# Patient Record
Sex: MALE | Race: WHITE | Employment: FULL TIME | URBAN - METROPOLITAN AREA
[De-identification: names, ages, dates, MRNs, and addresses within clinical notes are randomized per-mention and may not be internally consistent; named-entity substitution may affect disease eponyms.]

---

## 2017-04-25 ENCOUNTER — OFFICE VISIT (OUTPATIENT)
Dept: NEUROLOGY | Age: 62
End: 2017-04-25

## 2017-04-25 VITALS
WEIGHT: 218.8 LBS | HEART RATE: 74 BPM | OXYGEN SATURATION: 96 % | HEIGHT: 74 IN | DIASTOLIC BLOOD PRESSURE: 68 MMHG | SYSTOLIC BLOOD PRESSURE: 134 MMHG | RESPIRATION RATE: 18 BRPM | BODY MASS INDEX: 28.08 KG/M2 | TEMPERATURE: 98.1 F

## 2017-04-25 DIAGNOSIS — G35 MULTIPLE SCLEROSIS (HCC): Primary | ICD-10-CM

## 2017-04-25 NOTE — PROGRESS NOTES
Neurology Consult      Subjective:      Idalia Lo is a 64 y.o. male who returns with 12+ years of relapsing remitting MS. Is on 3 times a week Copaxone. I do not think he remembers his last relapse. Does not mention any new medical or surgical history. Had an episode in the recent past where he felt a little exhausted but simply recouped himself by getting rest and was back to his baseline. Had a MRI scan of the cervical spine in December showing simply an established chronic C2 lesion and mild spinal stenosis at C6-7 and mild right neural foraminal encroachment C5-6. MRI of the brain stable no enhancement and minimal T2 lesion burden. Has downsized in his house reflecting the practical end of things and is doing well although is in the process of selling his house because wife apparently has some allergy issues with the environment and similar problems? Went over the idea of maintaining the status quo with his medicine even though he continues to do well and is no longer in a consistent relapsing remitting mode. Exam looks baseline and will suggest revisit in 6 months. Current Outpatient Prescriptions   Medication Sig Dispense Refill    cpap machine kit by Does Not Apply route.  COPAXONE 40 mg/mL injection INJECT 40MG SUBCUTANEOUSLY THREE TIMES PER WEEK AT LEAST 48 HR APART. ALLOW SYRINGE TO WARM TO ROOM TEMP FOR 20 MIN. DO NOT EXPEL AIR BUBBLE 36 Syringe 3    sitaGLIPtin-metFORMIN (JANUMET) 50-1,000 mg per tablet Take 1 Tab by mouth two (2) times daily (with meals).  methotrexate (RHEUMATREX) 2.5 mg tablet Take  by mouth every Monday.  folic acid (FOLVITE) 1 mg tablet Take  by mouth daily.  Cholecalciferol, Vitamin D3, (VITAMIN D3) 1,000 unit cap Take  by mouth.  losartan (COZAAR) 100 mg tablet Take 100 mg by mouth daily.  lovastatin (MEVACOR) 10 mg tablet Take  by mouth nightly.       betamethasone dipropionate (DIPROLENE) 0.05 % ointment Apply  to affected area two (2) times a day.  hydrocortisone valerate (WEST-SHAKEEL) 0.2 % ointment Apply  to affected area two (2) times a day. use thin layer       solifenacin (VESICARE) 5 mg tablet Take 5 mg by mouth daily.  HYDROcodone-acetaminophen (NORCO) 5-325 mg per tablet Take 1 Tab by mouth every four (4) hours as needed for Pain. 20 Tab 0    piroxicam (FELDENE) 20 mg capsule Take 20 mg by mouth daily. Allergies   Allergen Reactions    Amoxicillin Rash     Past Medical History:   Diagnosis Date    Arthritis     Cancer ([facility]     skin cancer    Diabetes ([facility]     no meds at this time/ md watching results    Hypertension     Other ill-defined conditions 2002    ms    Other ill-defined conditions     psoriasis    Other ill-defined conditions     high cholesterol      Past Surgical History:   Procedure Laterality Date    ABDOMEN SURGERY PROC UNLISTED  2007    cholecystectomy    HX HEENT      benign tumor removed face      Social History     Social History    Marital status:      Spouse name: N/A    Number of children: N/A    Years of education: N/A     Occupational History    Not on file. Social History Main Topics    Smoking status: Never Smoker    Smokeless tobacco: Never Used    Alcohol use No    Drug use: No    Sexual activity: Not on file     Other Topics Concern    Not on file     Social History Narrative      Family History   Problem Relation Age of Onset    Cancer Father       Visit Vitals    /68    Pulse 74    Temp 98.1 °F (36.7 °C) (Oral)    Resp 18    Ht 6' 2\" (1.88 m)    Wt 99.2 kg (218 lb 12.8 oz)    SpO2 96%    BMI 28.09 kg/m2        Review of Systems:   A comprehensive review of systems was negative except for that written in the HPI. Neuro Exam:     Appearance: The patient is well developed, well nourished, provides a coherent history and is in no acute distress. Mental Status: Oriented to time, place and person. Mood and affect appropriate. Cranial Nerves:   Intact visual fields. Fundi are benign. MICHAEL, EOM's full, no nystagmus, no ptosis. Facial sensation is normal. Corneal reflexes are intact. Facial movement is symmetric. Hearing is normal bilaterally. Palate is midline with normal sternocleidomastoid and trapezius muscles are normal. Tongue is midline. Motor:  5/5 strength in upper and lower proximal and distal muscles. Normal bulk and tone. No fasciculations. Reflexes:   Deep tendon reflexes 2+/4 and symmetrical.   Sensory:   Normal to touch, pinprick and vibration. Gait:  Normal gait. Tremor:   No tremor noted. Cerebellar:  No cerebellar signs present. Neurovascular:  Normal heart sounds and regular rhythm, peripheral pulses intact, and no carotid bruits. Assessment:   Multiple sclerosis. Patient is doing fantastic and recommend continuation of Copaxone and revisit in 6 months. Stay as reasonably and safely busy as possible. Plan:   Revisit 6 months.   Signed by :  Chandan Anderson MD

## 2017-04-25 NOTE — MR AVS SNAPSHOT
Visit Information Date & Time Provider Department Dept. Phone Encounter #  
 4/25/2017  1:40 PM Paula Mercado MD [facility]rology [phone] Follow-up Instructions Return in about 6 months (around 10/25/2017). Follow-up and Disposition History Your Appointments 10/24/2017  1:40 PM  
Follow Up with Paula Mercado MD  
[facility] Appt Note: follow up MS  $CP  russell  4/25/17 [place] [address] [facility] [phone] [phone]  
  
   
 [place] [address] [id number] [address] Upcoming Health Maintenance Date Due Hepatitis C Screening 1955 DTaP/Tdap/Td series (1 - Tdap) 5/14/1976 FOBT Q 1 YEAR AGE 50-75 5/14/2005 ZOSTER VACCINE AGE 60> 5/14/2015 INFLUENZA AGE 9 TO ADULT 8/1/2016 Allergies as of 4/25/2017  Review Complete On: 4/25/2017 By: Paula Mercado MD  
  
 Severity Noted Reaction Type Reactions Amoxicillin  04/01/2011    Rash Current Immunizations  Never Reviewed No immunizations on file. Not reviewed this visit You Were Diagnosed With   
  
 Codes Comments [facility]    -  Primary ICD-10-CM: G35 
ICD-9-CM: 193 Vitals BP Pulse Temp Resp Height(growth percentile) Weight(growth percentile) 134/68 74 98.1 °F (36.7 °C) (Oral) 18 6' 2\" (1.88 m) 218 lb 12.8 oz (99.2 kg) SpO2 BMI Smoking Status 96% 28.09 kg/m2 Never Smoker Vitals History BMI and BSA Data Body Mass Index Body Surface Area 28.09 kg/m 2 2.28 m 2 Preferred Pharmacy Pharmacy Name Phone  [address] [phone] Your Updated Medication List  
  
   
This list is accurate as of: 4/25/17  2:09 PM.  Always use your most recent med list.  
  
  
  
  
 betamethasone dipropionate 0.05 % ointment Commonly known as:  Eva Crowder  
 Apply  to affected area two (2) times a day. COPAXONE 40 mg/mL injection Generic drug:  glatiramer INJECT 40MG SUBCUTANEOUSLY THREE TIMES PER WEEK AT LEAST 48 HR APART. ALLOW SYRINGE TO WARM TO ROOM TEMP FOR 20 MIN. DO NOT EXPEL AIR BUBBLE  
  
 cpap machine kit  
by Does Not Apply route. folic acid 1 mg tablet Commonly known as:  Google Take  by mouth daily. HYDROcodone-acetaminophen 5-325 mg per tablet Commonly known as:  Jerl Ards Take 1 Tab by mouth every four (4) hours as needed for Pain.  
  
 hydrocortisone valerate 0.2 % ointment Commonly known as:  WEST-SHAKEEL Apply  to affected area two (2) times a day. use thin layer JANUMET 50-1,000 mg per tablet Generic drug:  SITagliptin-metFORMIN Take 1 Tab by mouth two (2) times daily (with meals). losartan 100 mg tablet Commonly known as:  COZAAR Take 100 mg by mouth daily. lovastatin 10 mg tablet Commonly known as:  MEVACOR Take  by mouth nightly. methotrexate 2.5 mg tablet Commonly known as:  Shellia Millet Take  by mouth every Monday. piroxicam 20 mg capsule Commonly known as:  Rozanna Johnson Take 20 mg by mouth daily. VESIcare 5 mg tablet Generic drug:  solifenacin Take 5 mg by mouth daily. VITAMIN D3 1,000 unit Cap Generic drug:  cholecalciferol Take  by mouth. Follow-up Instructions Return in about 6 months (around 10/25/2017). Patient Instructions PRESCRIPTION REFILL POLICY [facility] Statement to Patients April 1, 2014 In an effort to ensure the large volume of patient prescription refills is processed in the most efficient and expeditious manner, we are asking our patients to assist us by calling your Pharmacy for all prescription refills, this will include also your  Mail Order Pharmacy. The pharmacy will contact our office electronically to continue the refill process. Please do not wait until the last minute to call your pharmacy. We need at least 48 hours (2days) to fill prescriptions. We also encourage you to call your pharmacy before going to  your prescription to make sure it is ready. With regard to controlled substance prescription refill requests (narcotic refills) that need to be picked up at our office, we ask your cooperation by providing us with at least 72 hours (3days) notice that you will need a refill. We will not refill narcotic prescription refill requests after 4:00pm on any weekday, Monday through Thursday, or after 2:00pm on Fridays, or on the weekends. We encourage everyone to explore another way of getting your prescription refill request processed using [employer] / [employer]LL, our patient web portal through our electronic medical record system. [employer] is an efficient and effective way to communicate your medication request directly to the office and  downloadable as an nash on your smart phone . [employer] also features a review functionality that allows you to view your medication list as well as leave messages for your physician. Are you ready to get connected? If so please review the attatched instructions or speak to any of our staff to get you set up right away! Thank you so much for your cooperation. Should you have any questions please contact our Practice Administrator. The Physicians and Staff,  [facility] M[address] What is a living will? A living will is a legal form you use to write down the kind of care you want at the end of your life. It is used by the health professionals who will treat you if you aren't able to decide for yourself. If you put your wishes in writing, your loved ones and others will know what kind of care you want. They won't need to guess. This can ease your mind and be helpful to others. A living will is not the same as an estate or property will.  An estate will explains what you want to happen with your money and property after you die. Is a living will a legal document? A living will is a legal document. Each state has its own laws about living sanders. If you move to another state, make sure that your living will is legal in the state where you now live. Or you might use a universal form that has been approved by many states. This kind of form can sometimes be completed and stored online. Your electronic copy will then be available wherever you have a connection to the Internet. In most cases, doctors will respect your wishes even if you have a form from a different state. · You don't need an  to complete a living will. But legal advice can be helpful if your state's laws are unclear, your health history is complicated, or your family can't agree on what should be in your living will. · You can change your living will at any time. Some people find that their wishes about end-of-life care change as their health changes. · In addition to making a living will, think about completing a medical power of  form. This form lets you name the person you want to make end-of-life treatment decisions for you (your \"health care agent\") if you're not able to. Many hospitals and nursing homes will give you the forms you need to complete a living will and a medical power of . · Your living will is used only if you can't make or communicate decisions for yourself anymore. If you become able to make decisions again, you can accept or refuse any treatment, no matter what you wrote in your living will. · Your state may offer an online registry. This is a place where you can store your living will online so the doctors and nurses who need to treat you can find it right away. What should you think about when creating a living will?  
Talk about your end-of-life wishes with your family members and your doctor. Let them know what you want. That way the people making decisions for you won't be surprised by your choices. Think about these questions as you make your living will: · Do you know enough about life support methods that might be used? If not, talk to your doctor so you know what might be done if you can't breathe on your own, your heart stops, or you're unable to swallow. · What things would you still want to be able to do after you receive life-support methods? Would you want to be able to walk? To speak? To eat on your own? To live without the help of machines? · If you have a choice, where do you want to be cared for? In your home? At a hospital or nursing home? · Do you want certain Taoism practices performed if you become very ill? · If you have a choice at the end of your life, where would you prefer to die? At home? In a hospital or nursing home? Somewhere else? · Would you prefer to be buried or cremated? · Do you want your organs to be donated after you die? What should you do with your living will? · Make sure that your family members and your health care agent have copies of your living will. · Give your doctor a copy of your living will to keep in your medical record. If you have more than one doctor, make sure that each one has a copy. · You may want to put a copy of your living will where it can be easily found. Where can you learn more? Go to http://eleonora-rafael.info/. Enter M532 in the search box to learn more about \"Learning About Living Nir. \" Current as of: February 24, 2016 Content Version: 11.2 © 5259-8455 [employer]. Care instructions adapted under license by [employer] (which disclaims liability or warranty for this information).  If you have questions about a medical condition or this instruction, always ask your healthcare professional. [address] any warranty or liability for your use of this information. Patient Instructions History Introducing [facility]! Jaquelin Yesenia introduces [email or web address] patient portal. Now you can access parts of your medical record, email your doctor's office, and request medication refills online. 1. In your internet browser, go to [email or web address] 2. Click on the First Time User? Click Here link in the Sign In box. You will see the New Member Sign Up page. 3. Enter your [email or web address] Access Code exactly as it appears below. You will not need to use this code after youve completed the sign-up process. If you do not sign up before the expiration date, you must request a new code. · [email or web address] Access Code: [email or web address] Expires: 7/24/2017  1:59 PM 
 
4. Enter the last four digits of your Social Security Number (xxxx) and Date of Birth (mm/dd/yyyy) as indicated and click Submit. You will be taken to the next sign-up page. 5. Create a [email or web address] ID. This will be your [email or web address] login ID and cannot be changed, so think of one that is secure and easy to remember. 6. Create a [email or web address] password. You can change your password at any time. 7. Enter your Password Reset Question and Answer. This can be used at a later time if you forget your password. 8. Enter your e-mail address. You will receive e-mail notification when new information is available in [address] 9. Click Sign Up. You can now view and download portions of your medical record. 10. Click the Download Summary menu link to download a portable copy of your medical information. If you have questions, please visit the Frequently Asked Questions section of the [email or web address] website. Remember, [email or web address] is NOT to be used for urgent needs. For medical emergencies, dial 911. Now available from your iPhone and Android! Please provide this summary of care documentation to your next provider. Your primary care clinician is listed as Marcelle Sylvester.  If you have any questions after today's visit, please call [phone].

## 2017-04-25 NOTE — PATIENT INSTRUCTIONS
[facility]   Statement to Patients  April 1, 2014      In an effort to ensure the large volume of patient prescription refills is processed in the most efficient and expeditious manner, we are asking our patients to assist us by calling your Pharmacy for all prescription refills, this will include also your  Mail Order Pharmacy. The pharmacy will contact our office electronically to continue the refill process. Please do not wait until the last minute to call your pharmacy. We need at least 48 hours (2days) to fill prescriptions. We also encourage you to call your pharmacy before going to  your prescription to make sure it is ready. With regard to controlled substance prescription refill requests (narcotic refills) that need to be picked up at our office, we ask your cooperation by providing us with at least 72 hours (3days) notice that you will need a refill. We will not refill narcotic prescription refill requests after 4:00pm on any weekday, Monday through Thursday, or after 2:00pm on Fridays, or on the weekends. We encourage everyone to explore another way of getting your prescription refill request processed using [employer], our patient web portal through our electronic medical record system. [employer] is an efficient and effective way to communicate your medication request directly to the office and  downloadable as an nash on your smart phone . [employer] also features a review functionality that allows you to view your medication list as well as leave messages for your physician. Are you ready to get connected? If so please review the attatched instructions or speak to any of our staff to get you set up right away! Thank you so much for your cooperation. Should you have any questions please contact our Practice Administrator.     The Physicians and Staff,  [facility] [address]  What is a living will? A living will is a legal form you use to write down the kind of care you want at the end of your life. It is used by the health professionals who will treat you if you aren't able to decide for yourself. If you put your wishes in writing, your loved ones and others will know what kind of care you want. They won't need to guess. This can ease your mind and be helpful to others. A living will is not the same as an estate or property will. An estate will explains what you want to happen with your money and property after you die. Is a living will a legal document? A living will is a legal document. Each state has its own laws about living sanders. If you move to another state, make sure that your living will is legal in the state where you now live. Or you might use a universal form that has been approved by many states. This kind of form can sometimes be completed and stored online. Your electronic copy will then be available wherever you have a connection to the Internet. In most cases, doctors will respect your wishes even if you have a form from a different state. · You don't need an  to complete a living will. But legal advice can be helpful if your state's laws are unclear, your health history is complicated, or your family can't agree on what should be in your living will. · You can change your living will at any time. Some people find that their wishes about end-of-life care change as their health changes. · In addition to making a living will, think about completing a medical power of  form. This form lets you name the person you want to make end-of-life treatment decisions for you (your \"health care agent\") if you're not able to. Many hospitals and nursing homes will give you the forms you need to complete a living will and a medical power of . · Your living will is used only if you can't make or communicate decisions for yourself anymore.  If you become able to make decisions again, you can accept or refuse any treatment, no matter what you wrote in your living will. · Your state may offer an online registry. This is a place where you can store your living will online so the doctors and nurses who need to treat you can find it right away. What should you think about when creating a living will? Talk about your end-of-life wishes with your family members and your doctor. Let them know what you want. That way the people making decisions for you won't be surprised by your choices. Think about these questions as you make your living will:  · Do you know enough about life support methods that might be used? If not, talk to your doctor so you know what might be done if you can't breathe on your own, your heart stops, or you're unable to swallow. · What things would you still want to be able to do after you receive life-support methods? Would you want to be able to walk? To speak? To eat on your own? To live without the help of machines? · If you have a choice, where do you want to be cared for? In your home? At a hospital or nursing home? · Do you want certain Sabianism practices performed if you become very ill? · If you have a choice at the end of your life, where would you prefer to die? At home? In a hospital or nursing home? Somewhere else? · Would you prefer to be buried or cremated? · Do you want your organs to be donated after you die? What should you do with your living will? · Make sure that your family members and your health care agent have copies of your living will. · Give your doctor a copy of your living will to keep in your medical record. If you have more than one doctor, make sure that each one has a copy. · You may want to put a copy of your living will where it can be easily found. Where can you learn more? Go to http://eleonora-rafael.info/. Enter N246 in the search box to learn more about \"Learning About Living Ja Michelle. \"  Current as of: February 24, 2016  Content Version: 11.2  © 9295-8343 [employer]. Care instructions adapted under license by [employer] (which disclaims liability or warranty for this information). If you have questions about a medical condition or this instruction, always ask your healthcare professional. [address] any warranty or liability for your use of this information.

## 2017-09-29 ENCOUNTER — TELEPHONE (OUTPATIENT)
Dept: NEUROLOGY | Age: 62
End: 2017-09-29

## 2017-09-29 NOTE — TELEPHONE ENCOUNTER
Received [facility] PA approval letter regarding Copaxone 40mg  Auth# [id number]  Effective 9/22/17-9/22/19

## 2017-10-09 ENCOUNTER — TELEPHONE (OUTPATIENT)
Dept: NEUROLOGY | Age: 62
End: 2017-10-09

## 2017-10-09 DIAGNOSIS — G35 MULTIPLE SCLEROSIS (HCC): ICD-10-CM

## 2017-10-09 RX ORDER — GLATIRAMER ACETATE 40 MG/ML
INJECTION, SOLUTION SUBCUTANEOUS
Qty: 36 SYRINGE | Refills: 3 | Status: SHIPPED | OUTPATIENT
Start: 2017-10-09 | End: 2018-08-17 | Stop reason: SDUPTHER

## 2017-10-09 NOTE — TELEPHONE ENCOUNTER
----- Message from Martin Singh sent at 10/9/2017 10:37 AM EDT -----  Regarding: ANGEL Thompson/Refill  Pt states he needs refill for Copaxone 40 mg for [facility] phone [phone] fax [phone], he only has two weeks left of the medication. His contact number is [phone].

## 2017-10-24 ENCOUNTER — HOSPITAL ENCOUNTER (OUTPATIENT)
Dept: GENERAL RADIOLOGY | Age: 62
Discharge: HOME | End: 2017-10-24
Attending: SPECIALIST
Payer: COMMERCIAL

## 2017-10-24 ENCOUNTER — OFFICE VISIT (OUTPATIENT)
Dept: NEUROLOGY | Age: 62
End: 2017-10-24

## 2017-10-24 VITALS
OXYGEN SATURATION: 98 % | HEIGHT: 74 IN | WEIGHT: 221.9 LBS | DIASTOLIC BLOOD PRESSURE: 70 MMHG | TEMPERATURE: 98.1 F | SYSTOLIC BLOOD PRESSURE: 130 MMHG | BODY MASS INDEX: 28.48 KG/M2 | HEART RATE: 75 BPM | RESPIRATION RATE: 18 BRPM

## 2017-10-24 DIAGNOSIS — M54.2 CERVICALGIA: ICD-10-CM

## 2017-10-24 DIAGNOSIS — G35 MULTIPLE SCLEROSIS (HCC): Primary | ICD-10-CM

## 2017-10-24 PROCEDURE — 72052 X-RAY EXAM NECK SPINE 6/>VWS: CPT

## 2017-10-24 RX ORDER — AMLODIPINE BESYLATE 5 MG/1
5 TABLET ORAL DAILY
COMMUNITY

## 2017-10-24 RX ORDER — GLIPIZIDE 5 MG/1
TABLET ORAL 2 TIMES DAILY
COMMUNITY

## 2017-10-24 RX ORDER — ATORVASTATIN CALCIUM 40 MG/1
TABLET, FILM COATED ORAL DAILY
COMMUNITY

## 2017-10-24 NOTE — MR AVS SNAPSHOT
Visit Information Date & Time Provider Department Dept. Phone Encounter #  
 10/24/2017  1:40 PM Nadir Guevara MD [facility] Neurology [facility] [phone] [id number] Follow-up Instructions Return in about 6 months (around 4/24/2018). Upcoming Health Maintenance Date Due Hepatitis C Screening 1955 DTaP/Tdap/Td series (1 - Tdap) 5/14/1976 FOBT Q 1 YEAR AGE 50-75 5/14/2005 ZOSTER VACCINE AGE 60> 3/14/2015 INFLUENZA AGE 9 TO ADULT 8/1/2017 Allergies as of 10/24/2017  Review Complete On: 10/24/2017 By: Nadir Guevara MD  
  
 Severity Noted Reaction Type Reactions Amoxicillin  04/01/2011    Rash Current Immunizations  Never Reviewed No immunizations on file. Not reviewed this visit You Were Diagnosed With   
  
 Codes Comments [facility]    -  Primary ICD-10-CM: G35 
ICD-9-CM: 932 Cervicalgia     ICD-10-CM: M54.2 ICD-9-CM: 723.1 Vitals BP Pulse Temp Resp Height(growth percentile) Weight(growth percentile) 130/70 75 98.1 °F (36.7 °C) (Oral) 18 6' 2\" (1.88 m) 221 lb 14.4 oz (100.7 kg) SpO2 BMI Smoking Status 98% 28.49 kg/m2 Never Smoker Vitals History BMI and BSA Data Body Mass Index Body Surface Area  
 28.49 kg/m 2 2.29 m 2 Preferred Pharmacy Pharmacy Name Phone  [address] [phone] Your Updated Medication List  
  
   
This list is accurate as of: 10/24/17  2:16 PM.  Always use your most recent med list. amLODIPine 5 mg tablet Commonly known as:  Clarke Dedrick Take 5 mg by mouth daily. betamethasone dipropionate 0.05 % ointment Commonly known as:  Magdaline Tiana Apply  to affected area two (2) times a day. COPAXONE 40 mg/mL injection Generic drug:  glatiramer INJECT 40MG SUBCUTANEOUSLY THREE TIMES PER WEEK AT LEAST 48 HR APART. ALLOW SYRINGE TO WARM TO ROOM TEMP FOR 20 MIN. DO NOT EXPEL AIR BUBBLE  
  
 cpap machine kit  
by Does Not Apply route. folic acid 1 mg tablet Commonly known as:  Google Take  by mouth daily. glipiZIDE 5 mg tablet Commonly known as:  Esther Macedo Take  by mouth two (2) times a day. hydrocortisone valerate 0.2 % ointment Commonly known as:  WEST-SHAKEEL Apply  to affected area two (2) times a day. use thin layer JANUMET 50-1,000 mg per tablet Generic drug:  SITagliptin-metFORMIN Take 1 Tab by mouth two (2) times daily (with meals). LIPITOR 40 mg tablet Generic drug:  atorvastatin Take  by mouth daily. losartan 100 mg tablet Commonly known as:  COZAAR Take 100 mg by mouth daily. methotrexate 2.5 mg tablet Commonly known as:  Melanie Skcecyns Take  by mouth every Monday. VESIcare 5 mg tablet Generic drug:  solifenacin Take 5 mg by mouth daily. VITAMIN D3 1,000 unit Cap Generic drug:  cholecalciferol Take  by mouth. Follow-up Instructions Return in about 6 months (around 4/24/2018). To-Do List   
 10/24/2017 Imaging:  XR SPINE CERV W OBL/FLEX/EXT MIN 6 V COMP Patient Instructions PRES[facility] RE[facility] POLIC[facility] Statement to Patients April 1, 2014 In an effort to ensure the large volume of patient prescription refills is processed in the most efficient and expeditious manner, we are asking our patients to assist us by calling your Pharmacy for all prescription refills, this will include also your  Mail Order Pharmacy. The pharmacy will contact our office electronically to continue the refill process. Please do not wait until the last minute to call your pharmacy. We need at least 48 hours (2days) to fill prescriptions. We also encourage you to call your pharmacy before going to  your prescription to make sure it is ready. With regard to controlled substance prescription refill requests (narcotic refills) that need to be picked up at our office, we ask your cooperation by providing us with at least 72 hours (3days) notice that you will need a refill. We will not refill narcotic prescription refill requests after 4:00pm on any weekday, Monday through Thursday, or after 2:00pm on Fridays, or on the weekends. We encourage everyone to explore another way of getting your prescription refill request processed using Ecovision, our patient web portal through our electronic medical record system. Ecovision is an efficient and effective way to communicate your medication request directly to the office and  downloadable as an nash on your smart phone . Ecovision also features a review functionality that allows you to view your medication list as well as leave messages for your physician. Are you ready to get connected? If so please review the attatched instructions or speak to any of our staff to get you set up right away! Thank you so much for your cooperation. Should you have any questions please contact our Practice Administrator. The Physicians and Staff,  [facility] 1721 What is a living will? A living will is a legal form you use to write down the kind of care you want at the end of your life. It is used by the health professionals who will treat you if you aren't able to decide for yourself. If you put your wishes in writing, your loved ones and others will know what kind of care you want. They won't need to guess. This can ease your mind and be helpful to others. A living will is not the same as an estate or property will. An estate will explains what you want to happen with your money and property after you die. Is a living will a legal document? A living will is a legal document.  Each state has its own laws about living sanders. If you move to another state, make sure that your living will is legal in the state where you now live. Or you might use a universal form that has been approved by many states. This kind of form can sometimes be completed and stored online. Your electronic copy will then be available wherever you have a connection to the Internet. In most cases, doctors will respect your wishes even if you have a form from a different state. · You don't need an  to complete a living will. But legal advice can be helpful if your state's laws are unclear, your health history is complicated, or your family can't agree on what should be in your living will. · You can change your living will at any time. Some people find that their wishes about end-of-life care change as their health changes. · In addition to making a living will, think about completing a medical power of  form. This form lets you name the person you want to make end-of-life treatment decisions for you (your \"health care agent\") if you're not able to. Many hospitals and nursing homes will give you the forms you need to complete a living will and a medical power of . · Your living will is used only if you can't make or communicate decisions for yourself anymore. If you become able to make decisions again, you can accept or refuse any treatment, no matter what you wrote in your living will. · Your state may offer an online registry. This is a place where you can store your living will online so the doctors and nurses who need to treat you can find it right away. What should you think about when creating a living will? Talk about your end-of-life wishes with your family members and your doctor. Let them know what you want. That way the people making decisions for you won't be surprised by your choices. Think about these questions as you make your living will: · Do you know enough about life support methods that might be used? If not, talk to your doctor so you know what might be done if you can't breathe on your own, your heart stops, or you're unable to swallow. · What things would you still want to be able to do after you receive life-support methods? Would you want to be able to walk? To speak? To eat on your own? To live without the help of machines? · If you have a choice, where do you want to be cared for? In your home? At a hospital or nursing home? · Do you want certain Druze practices performed if you become very ill? · If you have a choice at the end of your life, where would you prefer to die? At home? In a hospital or nursing home? Somewhere else? · Would you prefer to be buried or cremated? · Do you want your organs to be donated after you die? What should you do with your living will? · Make sure that your family members and your health care agent have copies of your living will. · Give your doctor a copy of your living will to keep in your medical record. If you have more than one doctor, make sure that each one has a copy. · You may want to put a copy of your living will where it can be easily found. Where can you learn more? Go to http://eleonora-rafael.info/. Enter P483 in the search box to learn more about \"Learning About Living Nir. \" Current as of: August 8, 2016 Content Version: 11.3 © 1891-9366 VideoLens. Care instructions adapted under license by [employer] (which disclaims liability or warranty for this information). If you have questions about a medical condition or this instruction, always ask your healthcare professional. [place] [address] any warranty or liability for your use of this information. History reviewed and exam performed.   Continue Copaxone as before and in light of recent neck stiffness and discomfort and bilateral arm symptoms will x-ray the neck and multi views. Revisit 6 months. Introducing [facility]! Estefanyrm Angel introduces Svbtle patient portal. Now you can access parts of your medical record, email your doctor's office, and request medication refills online. 1. In your internet browser, go to [email or web address] 2. Click on the First Time User? Click Here link in the Sign In box. You will see the New Member Sign Up page. 3. Enter your Svbtle Access Code exactly as it appears below. You will not need to use this code after youve completed the sign-up process. If you do not sign up before the expiration date, you must request a new code. · Svbtle Access Code: [email or web address] Expires: 1/22/2018  2:16 PM 
 
4. Enter the last four digits of your Social Security Number (xxxx) and Date of Birth (mm/dd/yyyy) as indicated and click Submit. You will be taken to the next sign-up page. 5. Create a Svbtle ID. This will be your Svbtle login ID and cannot be changed, so think of one that is secure and easy to remember. 6. Create a Svbtle password. You can change your password at any time. 7. Enter your Password Reset Question and Answer. This can be used at a later time if you forget your password. 8. Enter your e-mail address. You will receive e-mail notification when new information is available in [address] 9. Click Sign Up. You can now view and download portions of your medical record. 10. Click the Download Summary menu link to download a portable copy of your medical information. If you have questions, please visit the Frequently Asked Questions section of the Svbtle website. Remember, Svbtle is NOT to be used for urgent needs. For medical emergencies, dial 911. Now available from your iPhone and Android! Please provide this summary of care documentation to your next provider. Your primary care clinician is listed as Cuate Garza.  If you have any questions after today's visit, please call [phone].

## 2017-10-24 NOTE — PROGRESS NOTES
Relapsing remitting MS. Neurology Consult      Subjective:      Yanely Martin is a 58 y.o. male who is a long-established patient with relapsing remitting MS. Has been on Copaxone as his first and only immune modulating drug. Has done well and stays reasonably busy. Has noticed with increasing frequency referenced left and more recently right arm pains. Has neck stiffness and pain that is especially noticeable early the next day. Occasionally has issues in his sleep with the same pain spots. Has moved along in a cardiac direction based on initial abnormal EKG and then stress test with an abnormal EKG and a more recently designed study that is being pursued? Denies any known trauma to his neck area although in recent times he has done a lot of moving for both he and his wife as well as daughter. He looks good on today's exam and did not spot any new discrepancies on the neurological exam.  Maintains an excellent attitude and motivation and in my practice he is the patient that is been on Copaxone the longest as I know it. Revisit in 6 months. Current Outpatient Prescriptions   Medication Sig Dispense Refill    glipiZIDE (GLUCOTROL) 5 mg tablet Take  by mouth two (2) times a day.  amLODIPine (NORVASC) 5 mg tablet Take 5 mg by mouth daily.  atorvastatin (LIPITOR) 40 mg tablet Take  by mouth daily.  COPAXONE 40 mg/mL injection INJECT 40MG SUBCUTANEOUSLY THREE TIMES PER WEEK AT LEAST 48 HR APART. ALLOW SYRINGE TO WARM TO ROOM TEMP FOR 20 MIN. DO NOT EXPEL AIR BUBBLE 36 Syringe 3    cpap machine kit by Does Not Apply route.  sitaGLIPtin-metFORMIN (JANUMET) 50-1,000 mg per tablet Take 1 Tab by mouth two (2) times daily (with meals).  methotrexate (RHEUMATREX) 2.5 mg tablet Take  by mouth every Monday.  folic acid (FOLVITE) 1 mg tablet Take  by mouth daily.  Cholecalciferol, Vitamin D3, (VITAMIN D3) 1,000 unit cap Take  by mouth.       losartan (COZAAR) 100 mg tablet Take 100 mg by mouth daily.  betamethasone dipropionate (DIPROLENE) 0.05 % ointment Apply  to affected area two (2) times a day.  solifenacin (VESICARE) 5 mg tablet Take 5 mg by mouth daily.  hydrocortisone valerate (WEST-SHAKEEL) 0.2 % ointment Apply  to affected area two (2) times a day. use thin layer         Allergies   Allergen Reactions    Amoxicillin Rash     Past Medical History:   Diagnosis Date    Arthritis     Cancer ([facility]     skin cancer    Diabetes ([facility]     no meds at this time/ md watching results    Hypertension     Other ill-defined conditions(799.89) 2002    ms    Other ill-defined conditions(799.89)     psoriasis    Other ill-defined conditions(799.89)     high cholesterol      Past Surgical History:   Procedure Laterality Date    ABDOMEN SURGERY PROC UNLISTED  2007    cholecystectomy    HX HEENT      benign tumor removed face      Social History     Social History    Marital status:      Spouse name: N/A    Number of children: N/A    Years of education: N/A     Occupational History    Not on file. Social History Main Topics    Smoking status: Never Smoker    Smokeless tobacco: Never Used    Alcohol use No    Drug use: No    Sexual activity: Not on file     Other Topics Concern    Not on file     Social History Narrative      Family History   Problem Relation Age of Onset    Cancer Father       Visit Vitals    /70    Pulse 75    Temp 98.1 °F (36.7 °C) (Oral)    Resp 18    Ht 6' 2\" (1.88 m)    Wt 100.7 kg (221 lb 14.4 oz)    SpO2 98%    BMI 28.49 kg/m2        Review of Systems:   A comprehensive review of systems was negative except for that written in the HPI. Neuro Exam:     Appearance: The patient is well developed, well nourished, provides a coherent history and is in no acute distress. Mental Status: Oriented to time, place and person. Mood and affect appropriate. Cranial Nerves:   Intact visual fields. Fundi are benign. MICHAEL, EOM's full, no nystagmus, no ptosis. Facial sensation is normal. Corneal reflexes are intact. Facial movement is symmetric. Hearing is normal bilaterally. Palate is midline with normal sternocleidomastoid and trapezius muscles are normal. Tongue is midline. Motor:  5/5 strength in upper and lower proximal and distal muscles. Normal bulk and tone. No fasciculations. Reflexes:   Deep tendon reflexes 2+/4 and symmetrical.   Sensory:   Normal to touch, pinprick and vibration. Gait:  Normal gait. Tremor:   No tremor noted. Cerebellar:  No cerebellar signs present. Neurovascular:  Normal heart sounds and regular rhythm, peripheral pulses intact, and no carotid bruits. Assessment:   Relapsing remitting MS. Has been doing extremely well as usual.  Will continue the Copaxone. Stays rather busy and hopefully that can continue. Cervicalgia. Recently intensify neck stiffness and pain that is especially noteworthy early the next day. Has reference pain in the arms left greater than right. Please see progress notes. Will x-ray the neck multi views. Revisit 6 months.     Plan:   RV 6 months  Signed by :  Angelica Herrmann MD

## 2017-10-24 NOTE — PATIENT INSTRUCTIONS
[facility] Neurology Clinic   Statement to Patients  April 1, 2014      In an effort to ensure the large volume of patient prescription refills is processed in the most efficient and expeditious manner, we are asking our patients to assist us by calling your Pharmacy for all prescription refills, this will include also your  Mail Order Pharmacy. The pharmacy will contact our office electronically to continue the refill process. Please do not wait until the last minute to call your pharmacy. We need at least 48 hours (2days) to fill prescriptions. We also encourage you to call your pharmacy before going to  your prescription to make sure it is ready. With regard to controlled substance prescription refill requests (narcotic refills) that need to be picked up at our office, we ask your cooperation by providing us with at least 72 hours (3days) notice that you will need a refill. We will not refill narcotic prescription refill requests after 4:00pm on any weekday, Monday through Thursday, or after 2:00pm on Fridays, or on the weekends. We encourage everyone to explore another way of getting your prescription refill request processed using LoveIt, our patient web portal through our electronic medical record system. LoveIt is an efficient and effective way to communicate your medication request directly to the office and  downloadable as an nash on your smart phone . LoveIt also features a review functionality that allows you to view your medication list as well as leave messages for your physician. Are you ready to get connected? If so please review the attatched instructions or speak to any of our staff to get you set up right away! Thank you so much for your cooperation. Should you have any questions please contact our Practice Administrator.     The Physicians and Staff,  [facility] [address]  What is a living will? A living will is a legal form you use to write down the kind of care you want at the end of your life. It is used by the health professionals who will treat you if you aren't able to decide for yourself. If you put your wishes in writing, your loved ones and others will know what kind of care you want. They won't need to guess. This can ease your mind and be helpful to others. A living will is not the same as an estate or property will. An estate will explains what you want to happen with your money and property after you die. Is a living will a legal document? A living will is a legal document. Each state has its own laws about living sanders. If you move to another state, make sure that your living will is legal in the state where you now live. Or you might use a universal form that has been approved by many states. This kind of form can sometimes be completed and stored online. Your electronic copy will then be available wherever you have a connection to the Internet. In most cases, doctors will respect your wishes even if you have a form from a different state. · You don't need an  to complete a living will. But legal advice can be helpful if your state's laws are unclear, your health history is complicated, or your family can't agree on what should be in your living will. · You can change your living will at any time. Some people find that their wishes about end-of-life care change as their health changes. · In addition to making a living will, think about completing a medical power of  form. This form lets you name the person you want to make end-of-life treatment decisions for you (your \"health care agent\") if you're not able to. Many hospitals and nursing homes will give you the forms you need to complete a living will and a medical power of . · Your living will is used only if you can't make or communicate decisions for yourself anymore.  If you become able to make decisions again, you can accept or refuse any treatment, no matter what you wrote in your living will. · Your state may offer an online registry. This is a place where you can store your living will online so the doctors and nurses who need to treat you can find it right away. What should you think about when creating a living will? Talk about your end-of-life wishes with your family members and your doctor. Let them know what you want. That way the people making decisions for you won't be surprised by your choices. Think about these questions as you make your living will:  · Do you know enough about life support methods that might be used? If not, talk to your doctor so you know what might be done if you can't breathe on your own, your heart stops, or you're unable to swallow. · What things would you still want to be able to do after you receive life-support methods? Would you want to be able to walk? To speak? To eat on your own? To live without the help of machines? · If you have a choice, where do you want to be cared for? In your home? At a hospital or nursing home? · Do you want certain Episcopalian practices performed if you become very ill? · If you have a choice at the end of your life, where would you prefer to die? At home? In a hospital or nursing home? Somewhere else? · Would you prefer to be buried or cremated? · Do you want your organs to be donated after you die? What should you do with your living will? · Make sure that your family members and your health care agent have copies of your living will. · Give your doctor a copy of your living will to keep in your medical record. If you have more than one doctor, make sure that each one has a copy. · You may want to put a copy of your living will where it can be easily found. Where can you learn more? Go to http://eleonora-rafael.info/. Enter Q233 in the search box to learn more about \"Learning About Living Permoriah. \"  Current as of: August 8, 2016  Content Version: 11.3  © 5161-1012 Pan[employer], [place]. Care instructions adapted under license by [employer] (which disclaims liability or warranty for this information). If you have questions about a medical condition or this instruction, always ask your healthcare professional. [address] any warranty or liability for your use of this information. History reviewed and exam performed. Continue Copaxone as before and in light of recent neck stiffness and discomfort and bilateral arm symptoms will x-ray the neck and multi views. Revisit 6 months.

## 2017-10-26 ENCOUNTER — TELEPHONE (OUTPATIENT)
Dept: NEUROLOGY | Age: 62
End: 2017-10-26

## 2018-03-02 ENCOUNTER — HOSPITAL ENCOUNTER (OUTPATIENT)
Dept: DIABETES SERVICES | Age: 63
Discharge: HOME | End: 2018-03-02
Payer: COMMERCIAL

## 2018-03-02 DIAGNOSIS — E11.9 DIABETES MELLITUS WITHOUT COMPLICATION (HCC): ICD-10-CM

## 2018-03-02 PROCEDURE — G0109 DIAB MANAGE TRN IND/GROUP: HCPCS

## 2018-03-16 ENCOUNTER — HOSPITAL ENCOUNTER (OUTPATIENT)
Dept: DIABETES SERVICES | Age: 63
Discharge: HOME | End: 2018-03-16
Payer: COMMERCIAL

## 2018-03-16 DIAGNOSIS — E11.9 DIABETES MELLITUS WITHOUT COMPLICATION (HCC): ICD-10-CM

## 2018-03-16 PROCEDURE — G0109 DIAB MANAGE TRN IND/GROUP: HCPCS

## 2018-05-04 ENCOUNTER — OFFICE VISIT (OUTPATIENT)
Dept: NEUROLOGY | Age: 63
End: 2018-05-04

## 2018-05-04 VITALS
HEART RATE: 68 BPM | RESPIRATION RATE: 17 BRPM | SYSTOLIC BLOOD PRESSURE: 120 MMHG | OXYGEN SATURATION: 97 % | WEIGHT: 221 LBS | TEMPERATURE: 98 F | HEIGHT: 74 IN | BODY MASS INDEX: 28.36 KG/M2 | DIASTOLIC BLOOD PRESSURE: 76 MMHG

## 2018-05-04 DIAGNOSIS — G35 MULTIPLE SCLEROSIS (HCC): Primary | ICD-10-CM

## 2018-05-04 NOTE — PATIENT INSTRUCTIONS
Information Regarding Testing     If you have physican order for a test or a medication denied by your insurance company, this does not mean the test or medication is not appropriate for you as that is a medical decision, not a decision to be made by an insurance company representative or by an [facility] physician who has not interviewed and examined you. This is a decision to be made between you and your physician. The denial of services is a contractual matter between you and your insurance company, not an issue between your physician and the insurance company. If your test or medication is denied, you can take the following steps to help resolve the issue:    1. File a complaint with the [facility] regarding your insurance company's denial of services ordered for you. You can do this either by calling them directly or by completing an on-line complaint form on the Nuve. This can be found at [email or web address]    2. Also file a formal complaint with your insurance company and ask to have the name of the person denying the service so that you may explore a legal option should you be harmed by this denial of service. Again, the fact the insurance company will not pay for the service does not mean it is not medically necessary and I would encourage you to follow through with the plan that was made with your physician    3. File a written complaint with your employer so your employer and benefit manager is aware of the poor coverage they are providing their employees. If you have medicare/medicaid, complain to your representative in the House and to your Zoey Acosta.     [facility]   Statement to Patients  April 1, 2014      In an effort to ensure the large volume of patient prescription refills is processed in the most efficient and expeditious manner, we are asking our patients to assist us by calling your Pharmacy for all prescription refills, this will include also your  Mail Order Pharmacy. The pharmacy will contact our office electronically to continue the refill process. Please do not wait until the last minute to call your pharmacy. We need at least 48 hours (2days) to fill prescriptions. We also encourage you to call your pharmacy before going to  your prescription to make sure it is ready. With regard to controlled substance prescription refill requests (narcotic refills) that need to be picked up at our office, we ask your cooperation by providing us with at least 72 hours (3days) notice that you will need a refill. We will not refill narcotic prescription refill requests after 4:00pm on any weekday, Monday through Thursday, or after 2:00pm on Fridays, or on the weekends. We encourage everyone to explore another way of getting your prescription refill request processed using HauteLook, our patient web portal through our electronic medical record system. HauteLook is an efficient and effective way to communicate your medication request directly to the office and  downloadable as an nash on your smart phone . HauteLook also features a review functionality that allows you to view your medication list as well as leave messages for your physician. Are you ready to get connected? If so please review the attatched instructions or speak to any of our staff to get you set up right away! Thank you so much for your cooperation. Should you have any questions please contact our Practice Administrator. The Physicians and Staff,  [facility]     If we have ordered testing for you, we do not call patients with results and we do not give test results over the phone. We schedule follow up appointments so that your results can be discussed in person and any questions you have regarding them may be addressed.   If something of concern is revealed on your test, we will call you for a sooner follow up appointment. Additionally, results may be found by using the My Chart feature and one of our patient service representatives at the  can give you instructions on how to access this feature of our electronic medical record system. Learning About Living Nir  What is a living will? A living will is a legal form you use to write down the kind of care you want at the end of your life. It is used by the health professionals who will treat you if you aren't able to decide for yourself. If you put your wishes in writing, your loved ones and others will know what kind of care you want. They won't need to guess. This can ease your mind and be helpful to others. A living will is not the same as an estate or property will. An estate will explains what you want to happen with your money and property after you die. Is a living will a legal document? A living will is a legal document. Each state has its own laws about living sanders. If you move to another state, make sure that your living will is legal in the state where you now live. Or you might use a universal form that has been approved by many states. This kind of form can sometimes be completed and stored online. Your electronic copy will then be available wherever you have a connection to the Internet. In most cases, doctors will respect your wishes even if you have a form from a different state. · You don't need an  to complete a living will. But legal advice can be helpful if your state's laws are unclear, your health history is complicated, or your family can't agree on what should be in your living will. · You can change your living will at any time. Some people find that their wishes about end-of-life care change as their health changes. · In addition to making a living will, think about completing a medical power of  form.  This form lets you name the person you want to make end-of-life treatment decisions for you (your \"health care agent\") if you're not able to. Many hospitals and nursing homes will give you the forms you need to complete a living will and a medical power of . · Your living will is used only if you can't make or communicate decisions for yourself anymore. If you become able to make decisions again, you can accept or refuse any treatment, no matter what you wrote in your living will. · Your state may offer an online registry. This is a place where you can store your living will online so the doctors and nurses who need to treat you can find it right away. What should you think about when creating a living will? Talk about your end-of-life wishes with your family members and your doctor. Let them know what you want. That way the people making decisions for you won't be surprised by your choices. Think about these questions as you make your living will:  · Do you know enough about life support methods that might be used? If not, talk to your doctor so you know what might be done if you can't breathe on your own, your heart stops, or you're unable to swallow. · What things would you still want to be able to do after you receive life-support methods? Would you want to be able to walk? To speak? To eat on your own? To live without the help of machines? · If you have a choice, where do you want to be cared for? In your home? At a hospital or nursing home? · Do you want certain Holiness practices performed if you become very ill? · If you have a choice at the end of your life, where would you prefer to die? At home? In a hospital or nursing home? Somewhere else? · Would you prefer to be buried or cremated? · Do you want your organs to be donated after you die? What should you do with your living will? · Make sure that your family members and your health care agent have copies of your living will. · Give your doctor a copy of your living will to keep in your medical record.  If you have more than one doctor, make sure that each one has a copy. · You may want to put a copy of your living will where it can be easily found. Where can you learn more? Go to http://eleonora-rafael.info/. Enter I250 in the search box to learn more about \"Learning About Living Lovella Schooling. \"  Current as of: September 24, 2016  Content Version: 11.4  © 0529-7332 AmberPoint. Care instructions adapted under license by [employer]out TV (which disclaims liability or warranty for this information). If you have questions about a medical condition or this instruction, always ask your healthcare professional. [place] [address] any warranty or liability for your use of this information. Patient history reviewed and patient examined. Continue Copaxone and vitamin D and again stays reasonably busy both physically and mentally as possible. Fortunately through the years he has maintained a great composure of neurologic function. Hopefully that will continue. Revisit 6 months.

## 2018-05-04 NOTE — MR AVS SNAPSHOT
[facility] 
 
 
 [place] [address] [address] [phone] Patient: Lopez Norwood MRN: [id number] Visit Information Date & Time Provider Department Dept. Phone Encounter #  
 5/4/2018  1:00 PM Tiara Giron MD [facility] [phone] [id number] Follow-up Instructions Return in about 6 months (around 11/4/2018). Your Appointments 11/9/2018  3:20 PM  
Follow Up with Tiara Giron MD  
[facility]) Appt Note: [place] [address] [address] [phone]  
  
   
 [place] [address] [id number] [address] Upcoming Health Maintenance Date Due Hepatitis C Screening 1955 HEMOGLOBIN A1C Q6M 1955 LIPID PANEL Q1 1955 FOOT EXAM Q1 5/14/1965 MICROALBUMIN Q1 5/14/1965 EYE EXAM RETINAL OR DILATED Q1 5/14/1965 Pneumococcal 19-64 Medium Risk (1 of 1 - PPSV23) 5/14/1974 DTaP/Tdap/Td series (1 - Tdap) 5/14/1976 FOBT Q 1 YEAR AGE 50-75 5/14/2005 ZOSTER VACCINE AGE 60> 3/14/2015 Influenza Age 5 to Adult 8/1/2018 Allergies as of 5/4/2018  Review Complete On: 5/4/2018 By: Tiara Giron MD  
  
 Severity Noted Reaction Type Reactions Amoxicillin  04/01/2011    Rash Current Immunizations  Never Reviewed No immunizations on file. Not reviewed this visit You Were Diagnosed With   
  
 Codes Comments [facility] ([facility]    -  Primary ICD-10-CM: G35 
ICD-9-CM: 238 Vitals BP Pulse Temp Resp Height(growth percentile) Weight(growth percentile) 120/76 68 98 °F (36.7 °C) 17 6' 2\" (1.88 m) 221 lb (100.2 kg) SpO2 BMI Smoking Status 97% 28.37 kg/m2 Never Smoker Vitals History BMI and BSA Data Body Mass Index Body Surface Area 28.37 kg/m 2 2.29 m 2 Preferred Pharmacy Pharmacy Name Phone  [address] [phone] Your Updated Medication List  
  
   
This list is accurate as of 5/4/18  1:31 PM.  Always use your most recent med list. amLODIPine 5 mg tablet Commonly known as:  Tayla Giuseppe Take 5 mg by mouth daily. betamethasone dipropionate 0.05 % ointment Commonly known as:  Marshall Rucks Apply  to affected area two (2) times a day. COPAXONE 40 mg/mL injection Generic drug:  glatiramer INJECT 40MG SUBCUTANEOUSLY THREE TIMES PER WEEK AT LEAST 48 HR APART. ALLOW SYRINGE TO WARM TO ROOM TEMP FOR 20 MIN. DO NOT EXPEL AIR BUBBLE  
  
 cpap machine kit  
by Does Not Apply route. folic acid 1 mg tablet Commonly known as:  Amanda Take  by mouth daily. glipiZIDE 5 mg tablet Commonly known as:  Zoila Lau Take  by mouth two (2) times a day. hydrocortisone valerate 0.2 % ointment Commonly known as:  WEST-SHAKEEL Apply  to affected area two (2) times a day. use thin layer JANUMET 50-1,000 mg per tablet Generic drug:  SITagliptin-metFORMIN Take 1 Tab by mouth two (2) times daily (with meals). LIPITOR 40 mg tablet Generic drug:  atorvastatin Take  by mouth daily. losartan 100 mg tablet Commonly known as:  COZAAR Take 100 mg by mouth daily. methotrexate 2.5 mg tablet Commonly known as:  Pasquale Rode Take  by mouth every Monday. VESIcare 5 mg tablet Generic drug:  solifenacin Take 5 mg by mouth daily. VITAMIN D3 1,000 unit Cap Generic drug:  cholecalciferol Take  by mouth. Follow-up Instructions Return in about 6 months (around 11/4/2018). Patient Instructions Information Regarding Testing If you have physican order for a test or a medication denied by your insurance company, this does not mean the test or medication is not appropriate for you as that is a medical decision, not a decision to be made by an insurance company representative or by an [address] [place] [address] who has not interviewed and examined you. This is a decision to be made between you and your physician. The denial of services is a contractual matter between you and your insurance company, not an issue between your physician and the insurance company. If your test or medication is denied, you can take the following steps to help resolve the issue: 1. File a complaint with the [facility] regarding your insurance company's denial of services ordered for you. You can do this either by calling them directly or by completing an on-line complaint form on the Kuona. This can be found at [email or web address] 2. Also file a formal complaint with your insurance company and ask to have the name of the person denying the service so that you may explore a legal option should you be harmed by this denial of service. Again, the fact the insurance company will not pay for the service does not mean it is not medically necessary and I would encourage you to follow through with the plan that was made with your physician 3. File a written complaint with your employer so your employer and benefit manager is aware of the poor coverage they are providing their employees. If you have medicare/medicaid, complain to your representative in the House and to your Zoey Acosta. PRESCRIPTION REFILL POLICY [facility] Statement to Patients April 1, 2014 In an effort to ensure the large volume of patient prescription refills is processed in the most efficient and expeditious manner, we are asking our patients to assist us by calling your Pharmacy for all prescription refills, this will include also your  Mail Order Pharmacy.  The pharmacy will contact our office electronically to continue the refill process. Please do not wait until the last minute to call your pharmacy. We need at least 48 hours (2days) to fill prescriptions. We also encourage you to call your pharmacy before going to  your prescription to make sure it is ready. With regard to controlled substance prescription refill requests (narcotic refills) that need to be picked up at our office, we ask your cooperation by providing us with at least 72 hours (3days) notice that you will need a refill. We will not refill narcotic prescription refill requests after 4:00pm on any weekday, Monday through Thursday, or after 2:00pm on Fridays, or on the weekends. We encourage everyone to explore another way of getting your prescription refill request processed using [employer] Management, our patient web portal through our electronic medical record system. Integrated Medical Management is an efficient and effective way to communicate your medication request directly to the office and  downloadable as an nash on your smart phone . Integrated Medical Management also features a review functionality that allows you to view your medication list as well as leave messages for your physician. Are you ready to get connected? If so please review the attatched instructions or speak to any of our staff to get you set up right away! Thank you so much for your cooperation. Should you have any questions please contact our Practice Administrator. The Physicians and Staff,  [facility] If we have ordered testing for you, we do not call patients with results and we do not give test results over the phone. We schedule follow up appointments so that your results can be discussed in person and any questions you have regarding them may be addressed. If something of concern is revealed on your test, we will call you for a sooner follow up appointment.   Additionally, results may be found by using the My Chart feature and one of our patient service representatives at the  can give you instructions on how to access this feature of our electronic medical record system. Masha Siddiqi 1721 What is a living will? A living will is a legal form you use to write down the kind of care you want at the end of your life. It is used by the health professionals who will treat you if you aren't able to decide for yourself. If you put your wishes in writing, your loved ones and others will know what kind of care you want. They won't need to guess. This can ease your mind and be helpful to others. A living will is not the same as an estate or property will. An estate will explains what you want to happen with your money and property after you die. Is a living will a legal document? A living will is a legal document. Each state has its own laws about living sanders. If you move to another state, make sure that your living will is legal in the state where you now live. Or you might use a universal form that has been approved by many states. This kind of form can sometimes be completed and stored online. Your electronic copy will then be available wherever you have a connection to the Internet. In most cases, doctors will respect your wishes even if you have a form from a different state. · You don't need an  to complete a living will. But legal advice can be helpful if your state's laws are unclear, your health history is complicated, or your family can't agree on what should be in your living will. · You can change your living will at any time. Some people find that their wishes about end-of-life care change as their health changes. · In addition to making a living will, think about completing a medical power of  form.  This form lets you name the person you want to make end-of-life treatment decisions for you (your \"health care agent\") if you're not able to. Many hospitals and nursing homes will give you the forms you need to complete a living will and a medical power of . · Your living will is used only if you can't make or communicate decisions for yourself anymore. If you become able to make decisions again, you can accept or refuse any treatment, no matter what you wrote in your living will. · Your state may offer an online registry. This is a place where you can store your living will online so the doctors and nurses who need to treat you can find it right away. What should you think about when creating a living will? Talk about your end-of-life wishes with your family members and your doctor. Let them know what you want. That way the people making decisions for you won't be surprised by your choices. Think about these questions as you make your living will: · Do you know enough about life support methods that might be used? If not, talk to your doctor so you know what might be done if you can't breathe on your own, your heart stops, or you're unable to swallow. · What things would you still want to be able to do after you receive life-support methods? Would you want to be able to walk? To speak? To eat on your own? To live without the help of machines? · If you have a choice, where do you want to be cared for? In your home? At a hospital or nursing home? · Do you want certain Sabianist practices performed if you become very ill? · If you have a choice at the end of your life, where would you prefer to die? At home? In a hospital or nursing home? Somewhere else? · Would you prefer to be buried or cremated? · Do you want your organs to be donated after you die? What should you do with your living will? · Make sure that your family members and your health care agent have copies of your living will.  
· Give your doctor a copy of your living will to keep in your medical record. If you have more than one doctor, make sure that each one has a copy. · You may want to put a copy of your living will where it can be easily found. Where can you learn more? Go to http://eleonora-rafael.info/. Enter F824 in the search box to learn more about \"Learning About Living Lovesam Schooling. \" Current as of: September 24, 2016 Content Version: 11.4 © 3786-6807 MedVentive. Care instructions adapted under license by ActiveSec (which disclaims liability or warranty for this information). If you have questions about a medical condition or this instruction, always ask your healthcare professional. [place] [address] any warranty or liability for your use of this information. Patient history reviewed and patient examined. Continue Copaxone and vitamin D and again stays reasonably busy both physically and mentally as possible. Fortunately through the years he has maintained a great composure of neurologic function. Hopefully that will continue. Revisit 6 months. Introducing [facility]! Dear Raegan Lopez: 
Thank you for requesting a Nexopia account. Our records indicate that you already have an active Nexopia account. You can access your account anytime at [email or web address] Did you know that you can access your hospital and ER discharge instructions at any time in Nexopia? You can also review all of your test results from your hospital stay or ER visit. Additional Information If you have questions, please visit the Frequently Asked Questions section of the Nexopia website at [email or web address] Remember, Nexopia is NOT to be used for urgent needs. For medical emergencies, dial 911. Now available from your iPhone and Android! Please provide this summary of care documentation to your next provider. Your primary care clinician is listed as Samara Sterling.  If you have any questions after today's visit, please call [phone].

## 2018-05-04 NOTE — PROGRESS NOTES
Multiple sclerosis. Neurology Consult      Subjective:      Eben Yan is a 58 y.o. male who comes in with long-standing MS. Copaxone is his first and only drug and has not had any major attacks or disabling features. Continues his vitamin D and from a medical history perspective is doing quite reasonable as well. Tells me 1 of his cardiac drugs has facilitated constipation and is seeing the dermatologist about some low-grade skin cancer on the abdomen. Unfortunately his wife has some considerable handicapping features with neck disease and needs to assist if and as required. Patient's mood and behavior is good cognition is great and his attitude has always been fantastic. No fatigue factor and special sensory function ok. Balance is terrific and no fall history so far. Has had prior self limited neck pains and stiffness, but nothing more than mild and focused ddd and djd of C56 C67 levels. RV 6 months. Current Outpatient Prescriptions   Medication Sig Dispense Refill    glipiZIDE (GLUCOTROL) 5 mg tablet Take  by mouth two (2) times a day.  amLODIPine (NORVASC) 5 mg tablet Take 5 mg by mouth daily.  atorvastatin (LIPITOR) 40 mg tablet Take  by mouth daily.  COPAXONE 40 mg/mL injection INJECT 40MG SUBCUTANEOUSLY THREE TIMES PER WEEK AT LEAST 48 HR APART. ALLOW SYRINGE TO WARM TO ROOM TEMP FOR 20 MIN. DO NOT EXPEL AIR BUBBLE 36 Syringe 3    cpap machine kit by Does Not Apply route.  solifenacin (VESICARE) 5 mg tablet Take 5 mg by mouth daily.  sitaGLIPtin-metFORMIN (JANUMET) 50-1,000 mg per tablet Take 1 Tab by mouth two (2) times daily (with meals).  methotrexate (RHEUMATREX) 2.5 mg tablet Take  by mouth every Monday.  folic acid (FOLVITE) 1 mg tablet Take  by mouth daily.  Cholecalciferol, Vitamin D3, (VITAMIN D3) 1,000 unit cap Take  by mouth.  losartan (COZAAR) 100 mg tablet Take 100 mg by mouth daily.       betamethasone dipropionate (DIPROLENE) 0.05 % ointment Apply  to affected area two (2) times a day.  hydrocortisone valerate (WEST-SHAKEEL) 0.2 % ointment Apply  to affected area two (2) times a day. use thin layer         Allergies   Allergen Reactions    Amoxicillin Rash     Past Medical History:   Diagnosis Date    Arthritis     Cancer ([facility] [address]     skin cancer    Diabetes ([facility] [address]     no meds at this time/ md watching results    Hypertension     Other ill-defined conditions(799.89) 2002    ms    Other ill-defined conditions(799.89)     psoriasis    Other ill-defined conditions(799.89)     high cholesterol      Past Surgical History:   Procedure Laterality Date    ABDOMEN SURGERY PROC UNLISTED  2007    cholecystectomy    HX HEENT      benign tumor removed face      Social History     Social History    Marital status:      Spouse name: N/A    Number of children: N/A    Years of education: N/A     Occupational History    Not on file. Social History Main Topics    Smoking status: Never Smoker    Smokeless tobacco: Never Used    Alcohol use No    Drug use: No    Sexual activity: Not on file     Other Topics Concern    Not on file     Social History Narrative      Family History   Problem Relation Age of Onset    Cancer Father       Visit Vitals    /76    Pulse 68    Temp 98 °F (36.7 °C)    Resp 17    Ht 6' 2\" (1.88 m)    Wt 100.2 kg (221 lb)    SpO2 97%    BMI 28.37 kg/m2        Review of Systems:   A comprehensive review of systems was negative except for that written in the HPI. Neuro Exam:     Appearance: The patient is well developed, well nourished, provides a coherent history and is in no acute distress. Mental Status: Oriented to time, place and person. Mood and affect appropriate. Cranial Nerves:   Intact visual fields. Fundi are benign. MICHAEL, EOM's full, no nystagmus, no ptosis. Facial sensation is normal. Corneal reflexes are intact. Facial movement is symmetric. Hearing is normal bilaterally. Palate is midline with normal sternocleidomastoid and trapezius muscles are normal. Tongue is midline. Motor:  5/5 strength in upper and lower proximal and distal muscles. Normal bulk and tone. No fasciculations. Reflexes:   Deep tendon reflexes 2+/4 and symmetrical.   Sensory:   Normal to touch, pinprick and vibration. Gait:  Normal gait. Tremor:   No tremor noted. Cerebellar:  No cerebellar signs present. Neurovascular:  Normal heart sounds and regular rhythm, peripheral pulses intact, and no carotid bruits. Assessment:   Multiple sclerosis. Continues to do great and will not manipulate the Copaxone and suggest he continue vitamin D. Has managed to escape many of the entrapments of the disease and general health appears to be quite reasonable as well. Will recommend a revisit in 6 months. Plan:   Revisit in 6 months.   Signed by :  Maryanne Jordan MD

## 2018-06-30 ENCOUNTER — HOSPITAL ENCOUNTER (EMERGENCY)
Age: 63
Discharge: HOME | End: 2018-07-01
Attending: EMERGENCY MEDICINE
Payer: COMMERCIAL

## 2018-06-30 ENCOUNTER — APPOINTMENT (OUTPATIENT)
Dept: GENERAL RADIOLOGY | Age: 63
End: 2018-06-30
Attending: EMERGENCY MEDICINE
Payer: COMMERCIAL

## 2018-06-30 DIAGNOSIS — R07.89 ATYPICAL CHEST PAIN: Primary | ICD-10-CM

## 2018-06-30 DIAGNOSIS — M54.6 ACUTE RIGHT-SIDED THORACIC BACK PAIN: ICD-10-CM

## 2018-06-30 LAB
ALBUMIN SERPL-MCNC: 3.7 G/DL (ref 3.5–5)
ALBUMIN/GLOB SERPL: 1 {RATIO} (ref 1.1–2.2)
ALP SERPL-CCNC: 82 U/L (ref 45–117)
ALT SERPL-CCNC: 29 U/L (ref 12–78)
ANION GAP SERPL CALC-SCNC: 6 MMOL/L (ref 5–15)
APPEARANCE UR: CLEAR
AST SERPL-CCNC: 13 U/L (ref 15–37)
BACTERIA URNS QL MICRO: NEGATIVE /HPF
BASOPHILS # BLD: 0 K/UL (ref 0–0.1)
BASOPHILS NFR BLD: 0 % (ref 0–1)
BILIRUB SERPL-MCNC: 0.3 MG/DL (ref 0.2–1)
BILIRUB UR QL: NEGATIVE
BUN SERPL-MCNC: 16 MG/DL (ref 6–20)
BUN/CREAT SERPL: 16 (ref 12–20)
CALCIUM SERPL-MCNC: 8.8 MG/DL (ref 8.5–10.1)
CHLORIDE SERPL-SCNC: 104 MMOL/L (ref 97–108)
CO2 SERPL-SCNC: 30 MMOL/L (ref 21–32)
COLOR UR: ABNORMAL
CREAT SERPL-MCNC: 0.98 MG/DL (ref 0.7–1.3)
D DIMER PPP FEU-MCNC: 0.41 MG/L FEU (ref 0–0.65)
DIFFERENTIAL METHOD BLD: NORMAL
EOSINOPHIL # BLD: 0.1 K/UL (ref 0–0.4)
EOSINOPHIL NFR BLD: 1 % (ref 0–7)
EPITH CASTS URNS QL MICRO: ABNORMAL /LPF
ERYTHROCYTE [DISTWIDTH] IN BLOOD BY AUTOMATED COUNT: 13 % (ref 11.5–14.5)
GLOBULIN SER CALC-MCNC: 3.7 G/DL (ref 2–4)
GLUCOSE SERPL-MCNC: 119 MG/DL (ref 65–100)
GLUCOSE UR STRIP.AUTO-MCNC: 100 MG/DL
HCT VFR BLD AUTO: 43.8 % (ref 36.6–50.3)
HGB BLD-MCNC: 14.4 G/DL (ref 12.1–17)
HGB UR QL STRIP: NEGATIVE
HYALINE CASTS URNS QL MICRO: ABNORMAL /LPF (ref 0–5)
IMM GRANULOCYTES # BLD: 0 K/UL (ref 0–0.04)
IMM GRANULOCYTES NFR BLD AUTO: 0 % (ref 0–0.5)
KETONES UR QL STRIP.AUTO: NEGATIVE MG/DL
LEUKOCYTE ESTERASE UR QL STRIP.AUTO: NEGATIVE
LIPASE SERPL-CCNC: 220 U/L (ref 73–393)
LYMPHOCYTES # BLD: 1.9 K/UL (ref 0.8–3.5)
LYMPHOCYTES NFR BLD: 20 % (ref 12–49)
MCH RBC QN AUTO: 31.5 PG (ref 26–34)
MCHC RBC AUTO-ENTMCNC: 32.9 G/DL (ref 30–36.5)
MCV RBC AUTO: 95.8 FL (ref 80–99)
MONOCYTES # BLD: 0.9 K/UL (ref 0–1)
MONOCYTES NFR BLD: 9 % (ref 5–13)
NEUTS SEG # BLD: 6.6 K/UL (ref 1.8–8)
NEUTS SEG NFR BLD: 69 % (ref 32–75)
NITRITE UR QL STRIP.AUTO: NEGATIVE
NRBC # BLD: 0 K/UL (ref 0–0.01)
NRBC BLD-RTO: 0 PER 100 WBC
PH UR STRIP: 7.5 [PH] (ref 5–8)
PLATELET # BLD AUTO: 181 K/UL (ref 150–400)
PMV BLD AUTO: 8.9 FL (ref 8.9–12.9)
POTASSIUM SERPL-SCNC: 4 MMOL/L (ref 3.5–5.1)
PROT SERPL-MCNC: 7.4 G/DL (ref 6.4–8.2)
PROT UR STRIP-MCNC: NEGATIVE MG/DL
RBC # BLD AUTO: 4.57 M/UL (ref 4.1–5.7)
RBC #/AREA URNS HPF: ABNORMAL /HPF (ref 0–5)
SODIUM SERPL-SCNC: 140 MMOL/L (ref 136–145)
SP GR UR REFRACTOMETRY: 1.01 (ref 1–1.03)
TROPONIN I SERPL-MCNC: <0.05 NG/ML
UR CULT HOLD, URHOLD: NORMAL
UROBILINOGEN UR QL STRIP.AUTO: 1 EU/DL (ref 0.2–1)
WBC # BLD AUTO: 9.6 K/UL (ref 4.1–11.1)
WBC URNS QL MICRO: ABNORMAL /HPF (ref 0–4)

## 2018-06-30 PROCEDURE — 96361 HYDRATE IV INFUSION ADD-ON: CPT

## 2018-06-30 PROCEDURE — 93005 ELECTROCARDIOGRAM TRACING: CPT

## 2018-06-30 PROCEDURE — 85025 COMPLETE CBC W/AUTO DIFF WBC: CPT | Performed by: STUDENT IN AN ORGANIZED HEALTH CARE EDUCATION/TRAINING PROGRAM

## 2018-06-30 PROCEDURE — 71045 X-RAY EXAM CHEST 1 VIEW: CPT

## 2018-06-30 PROCEDURE — 84484 ASSAY OF TROPONIN QUANT: CPT | Performed by: EMERGENCY MEDICINE

## 2018-06-30 PROCEDURE — 99285 EMERGENCY DEPT VISIT HI MDM: CPT

## 2018-06-30 PROCEDURE — 81001 URINALYSIS AUTO W/SCOPE: CPT | Performed by: EMERGENCY MEDICINE

## 2018-06-30 PROCEDURE — 83690 ASSAY OF LIPASE: CPT | Performed by: EMERGENCY MEDICINE

## 2018-06-30 PROCEDURE — 74011250636 HC RX REV CODE- 250/636: Performed by: EMERGENCY MEDICINE

## 2018-06-30 PROCEDURE — 96374 THER/PROPH/DIAG INJ IV PUSH: CPT

## 2018-06-30 PROCEDURE — 80053 COMPREHEN METABOLIC PANEL: CPT | Performed by: STUDENT IN AN ORGANIZED HEALTH CARE EDUCATION/TRAINING PROGRAM

## 2018-06-30 PROCEDURE — 85379 FIBRIN DEGRADATION QUANT: CPT | Performed by: EMERGENCY MEDICINE

## 2018-06-30 PROCEDURE — 36415 COLL VENOUS BLD VENIPUNCTURE: CPT | Performed by: STUDENT IN AN ORGANIZED HEALTH CARE EDUCATION/TRAINING PROGRAM

## 2018-06-30 RX ORDER — KETOROLAC TROMETHAMINE 30 MG/ML
30 INJECTION, SOLUTION INTRAMUSCULAR; INTRAVENOUS
Status: COMPLETED | OUTPATIENT
Start: 2018-06-30 | End: 2018-06-30

## 2018-06-30 RX ORDER — ONDANSETRON 2 MG/ML
4 INJECTION INTRAMUSCULAR; INTRAVENOUS
Status: DISCONTINUED | OUTPATIENT
Start: 2018-06-30 | End: 2018-07-01 | Stop reason: HOSPADM

## 2018-06-30 RX ADMIN — SODIUM CHLORIDE 1000 ML: 900 INJECTION, SOLUTION INTRAVENOUS at 21:01

## 2018-06-30 RX ADMIN — KETOROLAC TROMETHAMINE 30 MG: 30 INJECTION, SOLUTION INTRAMUSCULAR at 22:37

## 2018-07-01 VITALS
DIASTOLIC BLOOD PRESSURE: 62 MMHG | OXYGEN SATURATION: 94 % | HEART RATE: 56 BPM | BODY MASS INDEX: 28.83 KG/M2 | HEIGHT: 74 IN | SYSTOLIC BLOOD PRESSURE: 124 MMHG | WEIGHT: 224.65 LBS | TEMPERATURE: 99.1 F | RESPIRATION RATE: 19 BRPM

## 2018-07-01 LAB — TROPONIN I SERPL-MCNC: <0.05 NG/ML

## 2018-07-01 PROCEDURE — 84484 ASSAY OF TROPONIN QUANT: CPT | Performed by: EMERGENCY MEDICINE

## 2018-07-01 PROCEDURE — 36415 COLL VENOUS BLD VENIPUNCTURE: CPT | Performed by: EMERGENCY MEDICINE

## 2018-07-01 NOTE — ED TRIAGE NOTES
Pt. States pain started on right side under shoulder blade yesterday, states pain is now between shoulder blades. +nausea feels more fatigue. States some chills and SOB.

## 2018-07-01 NOTE — ED NOTES
Discharge Instructions Reviewed with patient and spouse. Discharge instructions given to patient per Dr. Neisha Dejesus. Patient able to return verbalize discharge instructions. Paper copy of discharge instructions given. No RX given to patient per Dr. Neisha Dejesus. Patient condition stable, Respiratory status WNL, Neurostatus intact.  Ambulatory out of er, to home with spouse

## 2018-07-01 NOTE — DISCHARGE INSTRUCTIONS
Chest Pain: Care Instructions  Your Care Instructions    There are many things that can cause chest pain. Some are not serious and will get better on their own in a few days. But some kinds of chest pain need more testing and treatment. Your doctor may have recommended a follow-up visit in the next 8 to 12 hours. If you are not getting better, you may need more tests or treatment. Even though your doctor has released you, you still need to watch for any problems. The doctor carefully checked you, but sometimes problems can develop later. If you have new symptoms or if your symptoms do not get better, get medical care right away. If you have worse or different chest pain or pressure that lasts more than 5 minutes or you passed out (lost consciousness), call 911 or seek other emergency help right away. A medical visit is only one step in your treatment. Even if you feel better, you still need to do what your doctor recommends, such as going to all suggested follow-up appointments and taking medicines exactly as directed. This will help you recover and help prevent future problems. How can you care for yourself at home? · Rest until you feel better. · Take your medicine exactly as prescribed. Call your doctor if you think you are having a problem with your medicine. · Do not drive after taking a prescription pain medicine. When should you call for help? Call 911 if:  ? · You passed out (lost consciousness). ? · You have severe difficulty breathing. ? · You have symptoms of a heart attack. These may include:  ¨ Chest pain or pressure, or a strange feeling in your chest.  ¨ Sweating. ¨ Shortness of breath. ¨ Nausea or vomiting. ¨ Pain, pressure, or a strange feeling in your back, neck, jaw, or upper belly or in one or both shoulders or arms. ¨ Lightheadedness or sudden weakness. ¨ A fast or irregular heartbeat.   After you call 911, the  may tell you to chew 1 adult-strength or 2 to 4 low-dose aspirin. Wait for an ambulance. Do not try to drive yourself. ?Call your doctor today if:  ? · You have any trouble breathing. ? · Your chest pain gets worse. ? · You are dizzy or lightheaded, or you feel like you may faint. ? · You are not getting better as expected. ? · You are having new or different chest pain. Where can you learn more? Go to http://eleonora-rafael.info/. Enter A120 in the search box to learn more about \"Chest Pain: Care Instructions. \"  Current as of: March 20, 2017  Content Version: 11.4  © 5509-0172 [employer]. Care instructions adapted under license by [employer] (which disclaims liability or warranty for this information). If you have questions about a medical condition or this instruction, always ask your healthcare professional. [place] [address] any warranty or liability for your use of this information. Back Pain: Care Instructions  Your Care Instructions    Back pain has many possible causes. It is often related to problems with muscles and ligaments of the back. It may also be related to problems with the nerves, discs, or bones of the back. Moving, lifting, standing, sitting, or sleeping in an awkward way can strain the back. Sometimes you don't notice the injury until later. Arthritis is another common cause of back pain. Although it may hurt a lot, back pain usually improves on its own within several weeks. Most people recover in 12 weeks or less. Using good home treatment and being careful not to stress your back can help you feel better sooner. Follow-up care is a key part of your treatment and safety. Be sure to make and go to all appointments, and call your doctor if you are having problems. It's also a good idea to know your test results and keep a list of the medicines you take. How can you care for yourself at home?   · Sit or lie in positions that are most comfortable and reduce your pain. Try one of these positions when you lie down:  ¨ Lie on your back with your knees bent and supported by large pillows. ¨ Lie on the floor with your legs on the seat of a sofa or chair. Ralene Venus on your side with your knees and hips bent and a pillow between your legs. ¨ Lie on your stomach if it does not make pain worse. · Do not sit up in bed, and avoid soft couches and twisted positions. Bed rest can help relieve pain at first, but it delays healing. Avoid bed rest after the first day of back pain. · Change positions every 30 minutes. If you must sit for long periods of time, take breaks from sitting. Get up and walk around, or lie in a comfortable position. · Try using a heating pad on a low or medium setting for 15 to 20 minutes every 2 or 3 hours. Try a warm shower in place of one session with the heating pad. · You can also try an ice pack for 10 to 15 minutes every 2 to 3 hours. Put a thin cloth between the ice pack and your skin. · Take pain medicines exactly as directed. ¨ If the doctor gave you a prescription medicine for pain, take it as prescribed. ¨ If you are not taking a prescription pain medicine, ask your doctor if you can take an over-the-counter medicine. · Take short walks several times a day. You can start with 5 to 10 minutes, 3 or 4 times a day, and work up to longer walks. Walk on level surfaces and avoid hills and stairs until your back is better. · Return to work and other activities as soon as you can. Continued rest without activity is usually not good for your back. · To prevent future back pain, do exercises to stretch and strengthen your back and stomach. Learn how to use good posture, safe lifting techniques, and proper body mechanics. When should you call for help? Call your doctor now or seek immediate medical care if:  ? · You have new or worsening numbness in your legs. ? · You have new or worsening weakness in your legs.  (This could make it hard to stand up.) ? · You lose control of your bladder or bowels. ? Watch closely for changes in your health, and be sure to contact your doctor if:  ? · Your pain gets worse. ? · You are not getting better after 2 weeks. Where can you learn more? Go to http://eleonora-rafael.info/. Enter X189 in the search box to learn more about \"Back Pain: Care Instructions. \"  Current as of: March 21, 2017  Content Version: 11.4  © 9708-9679 [employer]. Care instructions adapted under license by [employer] (which disclaims liability or warranty for this information). If you have questions about a medical condition or this instruction, always ask your healthcare professional. [address] any warranty or liability for your use of this information.

## 2018-07-01 NOTE — ED PROVIDER NOTES
HPI Comments: 61 y.o. male with past medical history significant for HTN, DM, Arthritis, Cholecystectomy who presents from home with chief complaint of back pain. Pt reports persistent back pain since last night. Pt states that the pain was initalling located beneath his right shoulder blade. The pain has been constant, throbbing and aching since then. The pain has since transitioned to the middle of his back. Symptom is exacerbated with lying flat. He also c/o nausea, fatigue and chills today. He denies hx of similar symptoms. He took Tylenol this morning without relief. Pt denies any other acute medical concerns at this time. PCP: Washington Lozano MD    Note written by Steve Gifford, as dictated by Dheeraj Desai MD 9:04 PM      The history is provided by the patient. No  was used. Past Medical History:   Diagnosis Date    Arthritis     Cancer ([facility] [address]     skin cancer    Diabetes ([facility] [address]     no meds at this time/ md watching results    Hypertension     Other ill-defined conditions(799.89) 2002    ms    Other ill-defined conditions(799.89)     psoriasis    Other ill-defined conditions(799.89)     high cholesterol       Past Surgical History:   Procedure Laterality Date    ABDOMEN SURGERY PROC UNLISTED  2007    cholecystectomy    HX HEENT      benign tumor removed face         Family History:   Problem Relation Age of Onset    Cancer Father        Social History     Social History    Marital status:      Spouse name: N/A    Number of children: N/A    Years of education: N/A     Occupational History    Not on file. Social History Main Topics    Smoking status: Never Smoker    Smokeless tobacco: Never Used    Alcohol use No    Drug use: No    Sexual activity: Not on file     Other Topics Concern    Not on file     Social History Narrative         ALLERGIES: Amoxicillin    Review of Systems   Constitutional: Positive for chills and fatigue. Respiratory: Negative for cough and shortness of breath. Cardiovascular: Negative for chest pain. Gastrointestinal: Positive for nausea. Negative for abdominal pain, constipation, diarrhea and vomiting. Genitourinary: Negative for difficulty urinating, dysuria and hematuria. Musculoskeletal: Positive for back pain. Negative for neck pain. Neurological: Negative for headaches. Vitals:    06/30/18 2041   BP: 169/77   Pulse: 79   Resp: 19   Temp: 99.1 °F (37.3 °C)   SpO2: 98%   Weight: 101.9 kg (224 lb 10.4 oz)   Height: 6' 2\" (1.88 m)            Physical Exam   Constitutional: He appears well-developed and well-nourished. No distress. HENT:   Head: Normocephalic and atraumatic. Mouth/Throat: Oropharynx is clear and moist.   Eyes: Conjunctivae and EOM are normal.   Neck: Normal range of motion and phonation normal.   Cardiovascular: Normal rate and intact distal pulses. Pulmonary/Chest: Effort normal. No respiratory distress. He has no wheezes. He has no rales. Abdominal: Soft. He exhibits no distension. There is no tenderness. There is no rebound, no guarding and no CVA tenderness. Musculoskeletal: Normal range of motion. He exhibits no tenderness. Neurological: He is alert. He is not disoriented. He exhibits normal muscle tone. Skin: Skin is warm and dry. Nursing note and vitals reviewed. ED EKG interpretation:  Rhythm: normal sinus rhythm; and regular . Rate (approx.): 76; Axis: normal; P wave: normal; QRS interval: normal ; ST/T wave: non-specific changes; Other findings: abnormal ekg. This EKG was interpreted by Joan Altamirano MD,ED Provider. [facility]      ED Course     10:21 PM  PAtient reassessed and states that he feels \"OK\" but still has back pain, UA normal without hematuria. Ddimer normal.  Trop normal.  Given significant cardia history will continue to observe and will repeat trop. Toradol ordered for pain.    Procedures

## 2018-07-03 LAB
ATRIAL RATE: 76 BPM
CALCULATED P AXIS, ECG09: 1 DEGREES
CALCULATED R AXIS, ECG10: 12 DEGREES
CALCULATED T AXIS, ECG11: 26 DEGREES
DIAGNOSIS, 93000: NORMAL
P-R INTERVAL, ECG05: 192 MS
Q-T INTERVAL, ECG07: 376 MS
QRS DURATION, ECG06: 104 MS
QTC CALCULATION (BEZET), ECG08: 423 MS
VENTRICULAR RATE, ECG03: 76 BPM

## 2018-08-17 DIAGNOSIS — G35 MULTIPLE SCLEROSIS (HCC): ICD-10-CM

## 2018-08-17 RX ORDER — GLATIRAMER ACETATE 40 MG/ML
INJECTION, SOLUTION SUBCUTANEOUS
Qty: 36 SYRINGE | Refills: 2 | Status: SHIPPED | OUTPATIENT
Start: 2018-08-17 | End: 2019-04-30 | Stop reason: SDUPTHER

## 2018-11-09 ENCOUNTER — OFFICE VISIT (OUTPATIENT)
Dept: NEUROLOGY | Age: 63
End: 2018-11-09

## 2018-11-09 VITALS
SYSTOLIC BLOOD PRESSURE: 132 MMHG | RESPIRATION RATE: 18 BRPM | DIASTOLIC BLOOD PRESSURE: 68 MMHG | BODY MASS INDEX: 28.44 KG/M2 | OXYGEN SATURATION: 97 % | HEART RATE: 66 BPM | WEIGHT: 221.6 LBS | HEIGHT: 74 IN

## 2018-11-09 DIAGNOSIS — G35 MULTIPLE SCLEROSIS (HCC): Primary | ICD-10-CM

## 2018-11-09 RX ORDER — GUAIFENESIN 100 MG/5ML
81 LIQUID (ML) ORAL DAILY
COMMUNITY

## 2018-11-09 RX ORDER — METOPROLOL SUCCINATE 25 MG/1
TABLET, EXTENDED RELEASE ORAL DAILY
COMMUNITY

## 2018-11-09 NOTE — PATIENT INSTRUCTIONS
[facility]   Statement to Patients  April 1, 2014      In an effort to ensure the large volume of patient prescription refills is processed in the most efficient and expeditious manner, we are asking our patients to assist us by calling your Pharmacy for all prescription refills, this will include also your  Mail Order Pharmacy. The pharmacy will contact our office electronically to continue the refill process. Please do not wait until the last minute to call your pharmacy. We need at least 48 hours (2days) to fill prescriptions. We also encourage you to call your pharmacy before going to  your prescription to make sure it is ready. With regard to controlled substance prescription refill requests (narcotic refills) that need to be picked up at our office, we ask your cooperation by providing us with at least 72 hours (3days) notice that you will need a refill. We will not refill narcotic prescription refill requests after 4:00pm on any weekday, Monday through Thursday, or after 2:00pm on Fridays, or on the weekends. We encourage everyone to explore another way of getting your prescription refill request processed using gantto, our patient web portal through our electronic medical record system. gantto is an efficient and effective way to communicate your medication request directly to the office and  downloadable as an nash on your smart phone . gantto also features a review functionality that allows you to view your medication list as well as leave messages for your physician. Are you ready to get connected? If so please review the attatched instructions or speak to any of our staff to get you set up right away! Thank you so much for your cooperation. Should you have any questions please contact our Practice Administrator.     The Physicians and Staff,  [address] [place] [address] Neurology [address] [facility]  What is a living will?    A living will is a legal form you use to write down the kind of care you want at the end of your life. It is used by the health professionals who will treat you if you aren't able to decide for yourself. If you put your wishes in writing, your loved ones and others will know what kind of care you want. They won't need to guess. This can ease your mind and be helpful to others. A living will is not the same as an estate or property will. An estate will explains what you want to happen with your money and property after you die. Is a living will a legal document? A living will is a legal document. Each state has its own laws about living sanders. If you move to another state, make sure that your living will is legal in the state where you now live. Or you might use a universal form that has been approved by many states. This kind of form can sometimes be completed and stored online. Your electronic copy will then be available wherever you have a connection to the Internet. In most cases, doctors will respect your wishes even if you have a form from a different state. · You don't need an  to complete a living will. But legal advice can be helpful if your state's laws are unclear, your health history is complicated, or your family can't agree on what should be in your living will. · You can change your living will at any time. Some people find that their wishes about end-of-life care change as their health changes. · In addition to making a living will, think about completing a medical power of  form. This form lets you name the person you want to make end-of-life treatment decisions for you (your \"health care agent\") if you're not able to. Many hospitals and nursing homes will give you the forms you need to complete a living will and a medical power of . · Your living will is used only if you can't make or communicate decisions for yourself anymore.  If you become able to make decisions again, you can accept or refuse any treatment, no matter what you wrote in your living will. · Your state may offer an online registry. This is a place where you can store your living will online so the doctors and nurses who need to treat you can find it right away. What should you think about when creating a living will? Talk about your end-of-life wishes with your family members and your doctor. Let them know what you want. That way the people making decisions for you won't be surprised by your choices. Think about these questions as you make your living will:  · Do you know enough about life support methods that might be used? If not, talk to your doctor so you know what might be done if you can't breathe on your own, your heart stops, or you're unable to swallow. · What things would you still want to be able to do after you receive life-support methods? Would you want to be able to walk? To speak? To eat on your own? To live without the help of machines? · If you have a choice, where do you want to be cared for? In your home? At a hospital or nursing home? · Do you want certain Sabianism practices performed if you become very ill? · If you have a choice at the end of your life, where would you prefer to die? At home? In a hospital or nursing home? Somewhere else? · Would you prefer to be buried or cremated? · Do you want your organs to be donated after you die? What should you do with your living will? · Make sure that your family members and your health care agent have copies of your living will. · Give your doctor a copy of your living will to keep in your medical record. If you have more than one doctor, make sure that each one has a copy. · You may want to put a copy of your living will where it can be easily found. Where can you learn more? Go to http://eleonora-rafael.info/. Enter J213 in the search box to learn more about \"Learning About Living [place]oriah. \"  Current as of: April 19, 2018  Content Version: 11.8  © 2403-1777 [employer]. Care instructions adapted under license by [employer] (which disclaims liability or warranty for this information). If you have questions about a medical condition or this instruction, always ask your healthcare professional. [address] any warranty or liability for your use of this information. Patient history reviewed and patient examined. He is doing splendid and to  continue on the Copaxone and keep those legs in motion and mind in gear. Continue vitamin D and revisit 6 months.

## 2018-11-09 NOTE — PROGRESS NOTES
Multiple sclerosis. Neurology Consult      Subjective:      Radha Kaye is a 61 y.o. male With long-term MS it is been remarkably stable from the beginning. Is on his first and only immunomodulatory drug Copaxone. Does not site any new medical or surgical history. Interestingly was relating the story of his brother with myelodysplastic syndrome and needed bone marrow suppression and repopulation with stem cells from his daughter. Fortunately is doing well at this point. Patient has joined a fitness center with his wife. Does a regular routine with exercises and seems very genuinely motivated to continue this. Told him to find all reasons to keep his legs in motion and of course to keep his mind occupied. No current issues with fatigue balance special sensory function intact bowel and bladder function is steady-state etc. he looked baseline today and suggest revisit in 6 months. Current Outpatient Medications   Medication Sig Dispense Refill    metoprolol succinate (TOPROL-XL) 25 mg XL tablet Take  by mouth daily.  aspirin 81 mg chewable tablet Take 81 mg by mouth daily.  COPAXONE 40 mg/mL injection INJECT ONE SYRINGE (40 MG) SUBCUTANEOUSLY THREE TIMES PER WEEK AT LEAST 48 HOURS APART. ALLOW SYRINGE TO WARM TO ROOM TEMPERATURE FOR 20 MIN 36 Syringe 2    glipiZIDE (GLUCOTROL) 5 mg tablet Take  by mouth two (2) times a day.  amLODIPine (NORVASC) 5 mg tablet Take 5 mg by mouth daily.  atorvastatin (LIPITOR) 40 mg tablet Take  by mouth daily.  cpap machine kit by Does Not Apply route.  sitaGLIPtin-metFORMIN (JANUMET) 50-1,000 mg per tablet Take 1 Tab by mouth two (2) times daily (with meals).  methotrexate (RHEUMATREX) 2.5 mg tablet Take  by mouth every Monday.  folic acid (FOLVITE) 1 mg tablet Take  by mouth daily.  Cholecalciferol, Vitamin D3, (VITAMIN D3) 1,000 unit cap Take  by mouth.  losartan (COZAAR) 100 mg tablet Take 100 mg by mouth daily.  betamethasone dipropionate (DIPROLENE) 0.05 % ointment Apply  to affected area two (2) times a day.  hydrocortisone valerate (WEST-SHAKEEL) 0.2 % ointment Apply  to affected area two (2) times a day. use thin layer       solifenacin (VESICARE) 5 mg tablet Take 5 mg by mouth daily. Allergies   Allergen Reactions    Amoxicillin Rash     Past Medical History:   Diagnosis Date    Arthritis     Cancer ([facility] [address]     skin cancer    Diabetes ([facility]     no meds at this time/ md watching results    Hypertension     Other ill-defined conditions(799.89) 2002    ms    Other ill-defined conditions(799.89)     psoriasis    Other ill-defined conditions(799.89)     high cholesterol      Past Surgical History:   Procedure Laterality Date    ABDOMEN SURGERY PROC UNLISTED  2007    cholecystectomy    HX HEENT      benign tumor removed face      Social History     Socioeconomic History    Marital status:      Spouse name: Not on file    Number of children: Not on file    Years of education: Not on file    Highest education level: Not on file   Social Needs    Financial resource strain: Not on file    Food insecurity - worry: Not on file    Food insecurity - inability: Not on file   [employer] needs - medical: Not on file   [employer] needs - non-medical: Not on file   Occupational History    Not on file   Tobacco Use    Smoking status: Never Smoker    Smokeless tobacco: Never Used   Substance and Sexual Activity    Alcohol use: No    Drug use: No    Sexual activity: Not on file   Other Topics Concern    Not on file   Social History Narrative    Not on file      Family History   Problem Relation Age of Onset    Cancer Father       Visit Vitals  /68   Pulse 66   Resp 18   Ht 6' 2\" (1.88 m)   Wt 100.5 kg (221 lb 9.6 oz)   SpO2 97%   BMI 28.45 kg/m²        Review of Systems:   A comprehensive review of systems was negative except for that written in the HPI.       Neuro Exam: Appearance: The patient is well developed, well nourished, provides a coherent history and is in no acute distress. Mental Status: Oriented to time, place and person. Mood and affect appropriate. Cranial Nerves:   Intact visual fields. Fundi are benign. MICHAEL, EOM's full, no nystagmus, no ptosis. Facial sensation is normal. Corneal reflexes are intact. Facial movement is symmetric. Hearing is normal bilaterally. Palate is midline with normal sternocleidomastoid and trapezius muscles are normal. Tongue is midline. Motor:  5/5 strength in upper and lower proximal and distal muscles. Normal bulk and tone. No fasciculations. Reflexes:   Deep tendon reflexes 2+/4 and symmetrical.   Sensory:   Normal to touch, pinprick and vibration. Gait:  Normal gait. Tremor:   No tremor noted. Cerebellar:  No cerebellar signs present. Neurovascular:  Normal heart sounds and regular rhythm, peripheral pulses intact, and no carotid bruits. Assessment:   Multiple sclerosis. I will not change his therapy as he is done so well and has had no recent medical or surgical setbacks. Continue with his progress to keep his self physically in shape and his mind occupied. Revisit 6 months. Plan:   Revisit 6 months.   Signed by :  Ravi Soler MD

## 2019-04-30 DIAGNOSIS — G35 MULTIPLE SCLEROSIS (HCC): ICD-10-CM

## 2019-04-30 RX ORDER — GLATIRAMER ACETATE 40 MG/ML
INJECTION, SOLUTION SUBCUTANEOUS
Qty: 36 SYRINGE | Refills: 2 | Status: SHIPPED | OUTPATIENT
Start: 2019-04-30 | End: 2020-05-11 | Stop reason: ALTCHOICE

## 2019-06-14 ENCOUNTER — OFFICE VISIT (OUTPATIENT)
Dept: NEUROLOGY | Age: 64
End: 2019-06-14

## 2019-06-14 VITALS
SYSTOLIC BLOOD PRESSURE: 124 MMHG | OXYGEN SATURATION: 96 % | WEIGHT: 225.1 LBS | RESPIRATION RATE: 18 BRPM | DIASTOLIC BLOOD PRESSURE: 64 MMHG | HEART RATE: 71 BPM | HEIGHT: 74 IN | BODY MASS INDEX: 28.89 KG/M2

## 2019-06-14 DIAGNOSIS — G35 MULTIPLE SCLEROSIS (HCC): Primary | ICD-10-CM

## 2019-06-14 RX ORDER — ROSUVASTATIN CALCIUM 40 MG/1
40 TABLET, COATED ORAL
COMMUNITY

## 2019-06-14 NOTE — PATIENT INSTRUCTIONS
[facility]   Statement to Patients  April 1, 2014      In an effort to ensure the large volume of patient prescription refills is processed in the most efficient and expeditious manner, we are asking our patients to assist us by calling your Pharmacy for all prescription refills, this will include also your  Mail Order Pharmacy. The pharmacy will contact our office electronically to continue the refill process. Please do not wait until the last minute to call your pharmacy. We need at least 48 hours (2days) to fill prescriptions. We also encourage you to call your pharmacy before going to  your prescription to make sure it is ready. With regard to controlled substance prescription refill requests (narcotic refills) that need to be picked up at our office, we ask your cooperation by providing us with at least 72 hours (3days) notice that you will need a refill. We will not refill narcotic prescription refill requests after 4:00pm on any weekday, Monday through Thursday, or after 2:00pm on Fridays, or on the weekends. We encourage everyone to explore another way of getting your prescription refill request processed using [employer], our patient web portal through our electronic medical record system. [employer] is an efficient and effective way to communicate your medication request directly to the office and  downloadable as an nash on your smart phone . [employer] also features a review functionality that allows you to view your medication list as well as leave messages for your physician. Are you ready to get connected? If so please review the attatched instructions or speak to any of our staff to get you set up right away! Thank you so much for your cooperation. Should you have any questions please contact our Practice Administrator. The Physicians and Staff,  [facility]       Patient history reviewed and patient examined.   Doing well on his Copaxone and will not manipulate anything. Good luck with his prostate checkup and I will plan on seeing him in 6 months.

## 2019-06-14 NOTE — LETTER
6/14/19 Patient: Jose Du YOB: 1955 Date of Visit: 6/14/2019 Amanda Bran MD 
[address] [facility] [address] VIA Facsimile: [phone] Dear Amanda Bran MD, Thank you for referring Mr. Jose Du to [facility] for evaluation. My notes for this consultation are attached. If you have questions, please do not hesitate to call me. I look forward to following your patient along with you.  
 
 
Sincerely, 
 
Lyudmila Dotson MD

## 2019-06-14 NOTE — PROGRESS NOTES
Neurology Consult      Subjective:      Kvng Frances is a 59 y.o. male comes in today with long-standing MS. Reminds me I seen him for over 15 years. Is on his first and only drug Copaxone which he tolerates well and is really and of no particular cost to him after these many years. Will be going to Medicare so we will see if that changes at all. Says he has noticed at night that he is getting up more to use the bathroom and normally cuts off his fluids by 7 PM.  Gets a yearly prostate check with urologist and is coming up fairly soon. Did not mention any concerns as he goes to his balance his cognition bowel and bladder function beyond nocturia bulbar function and special sensory function etc.  Exam looks baseline and I will see him in 6 months. Current Outpatient Medications   Medication Sig Dispense Refill    rosuvastatin (CRESTOR) 40 mg tablet Take 40 mg by mouth nightly.  COPAXONE 40 mg/mL injection INJECT ONE SYRINGE (40 MG) SUBCUTANEOUSLY THREE TIMES PER WEEK AT LEAST 48 HOURS APART. ALLOW SYRINGE TO WARM TO ROOM TEMPERATURE FOR 20 MIN 36 Syringe 2    metoprolol succinate (TOPROL-XL) 25 mg XL tablet Take  by mouth daily.  aspirin 81 mg chewable tablet Take 81 mg by mouth daily.  glipiZIDE (GLUCOTROL) 5 mg tablet Take  by mouth two (2) times a day.  amLODIPine (NORVASC) 5 mg tablet Take 5 mg by mouth daily.  sitaGLIPtin-metFORMIN (JANUMET) 50-1,000 mg per tablet Take 1 Tab by mouth two (2) times daily (with meals).  methotrexate (RHEUMATREX) 2.5 mg tablet Take  by mouth every Monday.  folic acid (FOLVITE) 1 mg tablet Take  by mouth daily.  Cholecalciferol, Vitamin D3, (VITAMIN D3) 1,000 unit cap Take  by mouth.  losartan (COZAAR) 100 mg tablet Take 100 mg by mouth daily.  betamethasone dipropionate (DIPROLENE) 0.05 % ointment Apply  to affected area two (2) times a day.       hydrocortisone valerate (WEST-SHAKEEL) 0.2 % ointment Apply  to affected area two (2) times a day. use thin layer       atorvastatin (LIPITOR) 40 mg tablet Take  by mouth daily.  cpap machine kit by Does Not Apply route.  solifenacin (VESICARE) 5 mg tablet Take 5 mg by mouth daily.         Allergies   Allergen Reactions    Amoxicillin Rash     Past Medical History:   Diagnosis Date    Arthritis     Cancer ([facility] [address]     skin cancer    Diabetes ([facility] [address]     no meds at this time/ md watching results    Hypertension     Other ill-defined conditions(799.89) 2002    ms    Other ill-defined conditions(799.89)     psoriasis    Other ill-defined conditions(799.89)     high cholesterol      Past Surgical History:   Procedure Laterality Date    ABDOMEN SURGERY PROC UNLISTED  2007    cholecystectomy    HX HEENT      benign tumor removed face      Social History     Socioeconomic History    Marital status:      Spouse name: Not on file    Number of children: Not on file    Years of education: Not on file    Highest education level: Not on file   Occupational History    Not on file   Social Needs    Financial resource strain: Not on file    Food insecurity:     Worry: Not on file     Inability: Not on file    Transportation needs:     Medical: Not on file     Non-medical: Not on file   Tobacco Use    Smoking status: Never Smoker    Smokeless tobacco: Never Used   Substance and Sexual Activity    Alcohol use: No    Drug use: No    Sexual activity: Not on file   Lifestyle    Physical activity:     Days per week: Not on file     Minutes per session: Not on file    Stress: Not on file   Relationships    Social connections:     Talks on phone: Not on file     Gets together: Not on file     Attends [employer] service: Not on file     Active member of club or organization: Not on file     Attends meetings of clubs or organizations: Not on file     Relationship status: Not on file    Intimate partner violence:     Fear of current or ex partner: Not on file Emotionally abused: Not on file     Physically abused: Not on file     Forced sexual activity: Not on file   Other Topics Concern    Not on file   Social History Narrative    Not on file      Family History   Problem Relation Age of Onset    Cancer Father       Visit Vitals  /64   Pulse 71   Resp 18   Ht 6' 2\" (1.88 m)   Wt 102.1 kg (225 lb 1.6 oz)   SpO2 96%   BMI 28.90 kg/m²        Review of Systems:   A comprehensive review of systems was negative except for that written in the HPI. Neuro Exam:     Appearance: The patient is well developed, well nourished, provides a coherent history and is in no acute distress. Mental Status: Oriented to time, place and person. Mood and affect appropriate. Cranial Nerves:   Intact visual fields. Fundi are benign. MICHAEL, EOM's full, no nystagmus, no ptosis. Facial sensation is normal. Corneal reflexes are intact. Facial movement is symmetric. Hearing is normal bilaterally. Palate is midline with normal sternocleidomastoid and trapezius muscles are normal. Tongue is midline. Motor:  5/5 strength in upper and lower proximal and distal muscles. Normal bulk and tone. No fasciculations. Reflexes:   Deep tendon reflexes 2+/4 and symmetrical.   Sensory:   Normal to touch, pinprick and vibration. Gait:  Normal gait. Tremor:   No tremor noted. Cerebellar:  No cerebellar signs present. Neurovascular:  Normal heart sounds and regular rhythm, peripheral pulses intact, and no carotid bruits. Assessment:   MS.  Doing remarkably well and hopefully his upcoming prostate exam will be fine. Please see above dictation. Continue on Copaxone and revisit in 6 months. Plan:   Revisit 6 months.   Signed by :  Eric Cordero MD

## 2019-09-11 ENCOUNTER — TELEPHONE (OUTPATIENT)
Dept: NEUROLOGY | Age: 64
End: 2019-09-11

## 2019-09-11 NOTE — TELEPHONE ENCOUNTER
PA submitted for Copaxone 40mg (name brand) to [employer] via Cover My Meds. Status Pending. Allow 24-72 hours for response.      M Key: [id number]  Case #: [id number]

## 2019-09-12 NOTE — TELEPHONE ENCOUNTER
PA APPROVED for Copaxone (name brand) by [employer]. Effective dates 09/11/19 - 09/11/21. Case #[id number]. Approval will be scanned into media for review.  Please send Rx to patient's preferred pharmacy (if necessary)

## 2020-02-07 ENCOUNTER — OFFICE VISIT (OUTPATIENT)
Dept: NEUROLOGY | Age: 65
End: 2020-02-07

## 2020-02-07 VITALS
OXYGEN SATURATION: 97 % | WEIGHT: 222.2 LBS | RESPIRATION RATE: 18 BRPM | DIASTOLIC BLOOD PRESSURE: 64 MMHG | BODY MASS INDEX: 28.52 KG/M2 | SYSTOLIC BLOOD PRESSURE: 112 MMHG | HEART RATE: 54 BPM | HEIGHT: 74 IN

## 2020-02-07 DIAGNOSIS — G35 MULTIPLE SCLEROSIS (HCC): Primary | ICD-10-CM

## 2020-02-07 NOTE — LETTER
2/7/20 Patient: Leda Pinzon YOB: 1955 Date of Visit: 2/7/2020 Sudhakar Bolden MD 
[address] [facility] [address] [address] VIA Facsimile: [phone] Dear Sudhakar Bolden MD, Thank you for referring Mr. Leda Pinzon to [facility] for evaluation. My notes for this consultation are attached. If you have questions, please do not hesitate to call me. I look forward to following your patient along with you.  
 
 
Sincerely, 
 
Daniel Aponte MD

## 2020-02-07 NOTE — PATIENT INSTRUCTIONS
Patient history reviewed patient examined. Patient will continue to research the availability of generic Copaxone, but if unsuccessful we could find a transition drug that should not be costly.   Stay as reasonably busy as it goes to physical and mental activities and suggest revisit in 6 months

## 2020-02-07 NOTE — PROGRESS NOTES
Neurology Consult      Subjective:      Patt Bianchi is a 59 y.o. male who comes in today as a longstanding MS patient of mine. Has done absolutely fantastic on Copaxone 3 times a week formulation and transition from the daily version years ago. Unfortunately what he is finding is he basically cannot pay for the $8000 a year to continue that formulation. I asked him had he researched the daily generic formulation and he says he will look into that. Beyond that I strongly suggested check with me and at the very least [employer] is providing their drugs at a very reasonable cost savings during these times. I think with the oral agents as well there are certainly charitable directions that they have taken their patient population to extend coverage. As it is he brings me up to speed with his diabetes and between him and his doctor he is watching his weight and sugars which would directly come to bear on his performance both mentally and physically with MS. Fortunately his prostate is good and it sounds like his nocturia needs to be watched with discretionary surveillance of his fluid intake from the afternoon forward. Is doing well on the special sensory function and his cognition is good as I understand it. Balance is good and no falls and mood and behavior appear to be all appropriate. Says he plans on staying employed to around age 79, and I think that will improve his cognitive and physical capacities with MS. Current Outpatient Medications   Medication Sig Dispense Refill    rosuvastatin (CRESTOR) 40 mg tablet Take 40 mg by mouth nightly.  COPAXONE 40 mg/mL injection INJECT ONE SYRINGE (40 MG) SUBCUTANEOUSLY THREE TIMES PER WEEK AT LEAST 48 HOURS APART. ALLOW SYRINGE TO WARM TO ROOM TEMPERATURE FOR 20 MIN 36 Syringe 2    metoprolol succinate (TOPROL-XL) 25 mg XL tablet Take  by mouth daily.  aspirin 81 mg chewable tablet Take 81 mg by mouth daily.       glipiZIDE (GLUCOTROL) 5 mg tablet Take  by mouth two (2) times a day.  amLODIPine (NORVASC) 5 mg tablet Take 5 mg by mouth daily.  sitaGLIPtin-metFORMIN (JANUMET) 50-1,000 mg per tablet Take 1 Tab by mouth two (2) times daily (with meals).  methotrexate (RHEUMATREX) 2.5 mg tablet Take  by mouth every Monday.  folic acid (FOLVITE) 1 mg tablet Take  by mouth daily.  Cholecalciferol, Vitamin D3, (VITAMIN D3) 1,000 unit cap Take  by mouth.  losartan (COZAAR) 100 mg tablet Take 100 mg by mouth daily.  betamethasone dipropionate (DIPROLENE) 0.05 % ointment Apply  to affected area two (2) times a day.  hydrocortisone valerate (WEST-SHAKEEL) 0.2 % ointment Apply  to affected area two (2) times a day. use thin layer       atorvastatin (LIPITOR) 40 mg tablet Take  by mouth daily.  cpap machine kit by Does Not Apply route.  solifenacin (VESICARE) 5 mg tablet Take 5 mg by mouth daily.         Allergies   Allergen Reactions    Amoxicillin Rash     Past Medical History:   Diagnosis Date    Arthritis     Cancer ([facility] [address]     skin cancer    Diabetes ([facility] [address]     no meds at this time/ md watching results    Hypertension     Other ill-defined conditions(799.89) 2002    ms    Other ill-defined conditions(799.89)     psoriasis    Other ill-defined conditions(799.89)     high cholesterol      Past Surgical History:   Procedure Laterality Date    ABDOMEN SURGERY PROC UNLISTED  2007    cholecystectomy    HX HEENT      benign tumor removed face      Social History     Socioeconomic History    Marital status:      Spouse name: Not on file    Number of children: Not on file    Years of education: Not on file    Highest education level: Not on file   Occupational History    Not on file   Social Needs    Financial resource strain: Not on file    Food insecurity:     Worry: Not on file     Inability: Not on file    Transportation needs:     Medical: Not on file     Non-medical: Not on file Tobacco Use    Smoking status: Never Smoker    Smokeless tobacco: Never Used   Substance and Sexual Activity    Alcohol use: No    Drug use: No    Sexual activity: Not on file   Lifestyle    Physical activity:     Days per week: Not on file     Minutes per session: Not on file    Stress: Not on file   Relationships    Social connections:     Talks on phone: Not on file     Gets together: Not on file     Attends [employer] service: Not on file     Active member of club or organization: Not on file     Attends meetings of clubs or organizations: Not on file     Relationship status: Not on file    Intimate partner violence:     Fear of current or ex partner: Not on file     Emotionally abused: Not on file     Physically abused: Not on file     Forced sexual activity: Not on file   Other Topics Concern    Not on file   Social History Narrative    Not on file      Family History   Problem Relation Age of Onset    Cancer Father       Visit Vitals  /64   Pulse (!) 54   Resp 18   Ht 6' 2\" (1.88 m)   Wt 100.8 kg (222 lb 3.2 oz)   SpO2 97%   BMI 28.53 kg/m²        Review of Systems:   A comprehensive review of systems was negative except for that written in the HPI. Neuro Exam:     Appearance: The patient is well developed, well nourished, provides a coherent history and is in no acute distress. Mental Status: Oriented to time, place and person. Mood and affect appropriate. Cranial Nerves:   Intact visual fields. Fundi are benign. MICHAEL, EOM's full, no nystagmus, no ptosis. Facial sensation is normal. Corneal reflexes are intact. Facial movement is symmetric. Hearing is normal bilaterally. Palate is midline with normal sternocleidomastoid and trapezius muscles are normal. Tongue is midline. Motor:  5/5 strength in upper and lower proximal and distal muscles. Normal bulk and tone. No fasciculations.    Reflexes:   Deep tendon reflexes 2+/4 and symmetrical.   Sensory:   Normal to touch, pinprick and vibration. Gait:  Normal gait. Tremor:   No tremor noted. Cerebellar:  No cerebellar signs present. Neurovascular:  Normal heart sounds and regular rhythm, peripheral pulses intact, and no carotid bruits. Assessment:   Multiple sclerosis. I am hoping he can find some reasonable resolution on the Copaxone. If not I have volunteered to track down a transition drug that should not be costly. Stay as reasonably and safely busy as possible both physically and mentally in the meantime. Continues to watch his weight and his blood sugars and I think that is smart because medical comorbidities do define chronic diseases like MS. Revisit here 6 months. Plan:   RV 6 months. Evy Means Evy Means Signed by :  Tova Butcher MD

## 2020-05-11 RX ORDER — GLATIRAMER ACETATE 20 MG/ML
20 INJECTION, SOLUTION SUBCUTANEOUS DAILY
Qty: 30 SYRINGE | Refills: 5 | Status: SHIPPED | OUTPATIENT
Start: 2020-05-11 | End: 2020-05-13 | Stop reason: DRUGHIGH

## 2020-05-13 ENCOUNTER — TELEPHONE (OUTPATIENT)
Dept: NEUROLOGY | Age: 65
End: 2020-05-13

## 2020-05-13 RX ORDER — GLATIRAMER ACETATE 20 MG/ML
20 INJECTION, SOLUTION SUBCUTANEOUS DAILY
Qty: 30 SYRINGE | Refills: 11 | Status: SHIPPED | OUTPATIENT
Start: 2020-05-13

## 2020-05-13 NOTE — TELEPHONE ENCOUNTER
----- Message from Riley Quinn sent at 5/13/2020 12:19 PM EDT -----  Regarding: Dr Ranulfo Lipscomb is calling to get clarification on Copaxone, please call [phone]

## 2020-05-15 ENCOUNTER — PATIENT MESSAGE (OUTPATIENT)
Dept: NEUROLOGY | Age: 65
End: 2020-05-15

## 2020-05-18 NOTE — TELEPHONE ENCOUNTER
Call placed to [facility] to clarify Rx issues. Pharmacist Dale states that [facility] does not carry glatiramer 20 mg, but instead Glatopa only. Pharmacist also reports that the patient's co-pay is the same for Copaxone, Glatopa or glatiramer. Call placed to the patient with pharmacy information. Patient will contact [facility] and call the office if he needs assistance.

## 2020-05-27 NOTE — TELEPHONE ENCOUNTER
Prior Authorization NOT REQUIRED for Glatopa OR Glatiramer 20mg (x30 syringe/30 days), according to [employer] via Cover My Meds. CMM system returned with message stating, \"Available without authorization. \"     CMM Key: [id number] (Glatiramer), [id number] (Jemima Isabel)

## 2020-05-29 ENCOUNTER — TELEPHONE (OUTPATIENT)
Dept: NEUROLOGY | Age: 65
End: 2020-05-29

## 2020-06-02 NOTE — TELEPHONE ENCOUNTER
Received call from [facility] stating that PA was DENIED for Glatiramer. However, when I ran PA through Cover My Meds, received message that it did NOT REQUIRE prior auth. Called [place] [employer], was informed patient initiated request on 05/28/20 and that it was denied on 05/21/20 due to lack of information from the provider's office. Rep confirmed it does require prior auth. Stated since it was denied, we would have to submit an appeal. Was given fax number to send appeal information. Submitted appeal for Glatiramer. Should have response within 7 days. Will call [employer] on 06/09/20 if no response by then.

## 2020-06-23 NOTE — TELEPHONE ENCOUNTER
Prior Authorization (Tier Exception) DENIED for Glatiramer by [employer]. Denial reason states:     -Glatiramer is listed as a specialty drug on patient's formulary. Specialty drugs are exempt from tier exceptions in accordance with Medicare law. Does not give option to appeal. Patient may have to pay out of pocket or use a copay card/financial assistance if unable to afford the medication. Denial scanned into media for review.

## 2020-06-23 NOTE — TELEPHONE ENCOUNTER
Prior Auth was APPROVED for Glatiramer until 06/08/21. However, copay is still very high. Submitted tier exception to insurance for glatiramer as urgent. Should have a response within 24 hours.

## 2020-06-29 ENCOUNTER — TELEPHONE (OUTPATIENT)
Dept: NEUROLOGY | Age: 65
End: 2020-06-29

## 2020-06-29 NOTE — TELEPHONE ENCOUNTER
Message from patient via [employer]: Thanks! To complicate things even more, I will be moving to [address] [place] [address] later next month which may mean my insurance may change. And of course, I will have to find another neurologist there which will take time to do. If your team has any connections in [place] in regards to doctors, please let me know.

## 2020-06-29 NOTE — TELEPHONE ENCOUNTER
No, I don't [place]. However, [place], there has to be a good pick and would go with a neurologist that is board certified. Other attractions would be a good number of existing MS patients and familiarity with established and newer products of treatment. Good luck, it has always been a pleasure working with you and all the best to you and yours. [email or web address]

## 2021-12-16 ENCOUNTER — APPOINTMENT (OUTPATIENT)
Age: 66
Setting detail: DERMATOLOGY
End: 2021-12-16

## 2021-12-16 DIAGNOSIS — Z85.828 PERSONAL HISTORY OF OTHER MALIGNANT NEOPLASM OF SKIN: ICD-10-CM

## 2021-12-16 DIAGNOSIS — L40.0 PSORIASIS VULGARIS: ICD-10-CM

## 2021-12-16 DIAGNOSIS — L57.8 OTHER SKIN CHANGES DUE TO CHRONIC EXPOSURE TO NONIONIZING RADIATION: ICD-10-CM

## 2021-12-16 DIAGNOSIS — L40.59 OTHER PSORIATIC ARTHROPATHY: ICD-10-CM

## 2021-12-16 PROCEDURE — OTHER PRESCRIPTION MEDICATION MANAGEMENT: OTHER

## 2021-12-16 PROCEDURE — OTHER COUNSELING: OTHER

## 2021-12-16 PROCEDURE — 99204 OFFICE O/P NEW MOD 45 MIN: CPT

## 2021-12-16 PROCEDURE — OTHER PRESCRIPTION: OTHER

## 2021-12-16 PROCEDURE — OTHER ORDER TESTS: OTHER

## 2021-12-16 PROCEDURE — OTHER MIPS QUALITY: OTHER

## 2021-12-16 RX ORDER — CALCIPOTRIENE AND BETAMETHASONE DIPROPIONATE 50; .5 UG/G; MG/G
AEROSOL, FOAM TOPICAL
Qty: 60 | Refills: 3 | Status: ERX | COMMUNITY
Start: 2021-12-16

## 2021-12-16 ASSESSMENT — LOCATION DETAILED DESCRIPTION DERM
LOCATION DETAILED: RIGHT ELBOW
LOCATION DETAILED: RIGHT FOURTH PROXIMAL INTERPHALANGEAL JOINT
LOCATION DETAILED: SACRAL SPINE
LOCATION DETAILED: RIGHT SECOND PROXIMAL INTERPHALANGEAL JOINT
LOCATION DETAILED: LEFT KNEE
LOCATION DETAILED: LEFT SUPERIOR PARIETAL SCALP
LOCATION DETAILED: RIGHT KNEE
LOCATION DETAILED: LEFT ELBOW
LOCATION DETAILED: PERIANAL SKIN
LOCATION DETAILED: RIGHT DISTAL POSTERIOR THIGH
LOCATION DETAILED: RIGHT SUPERIOR PARIETAL SCALP
LOCATION DETAILED: LEFT SECOND PROXIMAL INTERPHALANGEAL JOINT
LOCATION DETAILED: LEFT POPLITEAL SKIN
LOCATION DETAILED: RIGHT MEDIAL BUTTOCK
LOCATION DETAILED: LEFT MEDIAL FRONTAL SCALP

## 2021-12-16 ASSESSMENT — LOCATION SIMPLE DESCRIPTION DERM
LOCATION SIMPLE: RIGHT BUTTOCK
LOCATION SIMPLE: LEFT ELBOW
LOCATION SIMPLE: RIGHT ELBOW
LOCATION SIMPLE: LEFT POPLITEAL SKIN
LOCATION SIMPLE: LEFT SECOND PIPJ
LOCATION SIMPLE: LEFT KNEE
LOCATION SIMPLE: LOWER BACK
LOCATION SIMPLE: RIGHT POSTERIOR THIGH
LOCATION SIMPLE: RIGHT KNEE
LOCATION SIMPLE: LEFT SCALP
LOCATION SIMPLE: SCALP
LOCATION SIMPLE: RIGHT SECOND PIPJ
LOCATION SIMPLE: PERIANAL SKIN
LOCATION SIMPLE: RIGHT FOURTH PIPJ

## 2021-12-16 ASSESSMENT — LOCATION ZONE DERM
LOCATION ZONE: LEG
LOCATION ZONE: ARM
LOCATION ZONE: SCALP
LOCATION ZONE: TRUNK
LOCATION ZONE: ANUS
LOCATION ZONE: TOE

## 2021-12-16 ASSESSMENT — BSA PSORIASIS: % BODY COVERED IN PSORIASIS: 19

## 2021-12-16 NOTE — HPI: RASH (PSORIASIS)
Do You Have A Family History Of Psoriasis?: yes
How Severe Is Your Psoriasis?: severe
Is This A New Presentation, Or A Follow-Up?: Psoriasis
Additional History: Patient states he was recently seen a dermatologist for his psoriasis and just recently gave him samples of Insta large said he was not compliant and did not use it as directed but when he did he noticed it helped but since he moved he is trying to get on the end still are they were initiating the prior authorization but because he moved he was not able to get it approved and is here now for enstilar prescription. Patient also tried and failed light therapy, but because of all the skin cancers on his has on his scalp it was making him worse and not better at all, the body was also getting worse as well.

## 2021-12-16 NOTE — PROCEDURE: PRESCRIPTION MEDICATION MANAGEMENT
Continue Regimen: Enstilar once daily for 5 weeks with 1 rf NOT 3, pt informed if rx not covered med is $75 with [facility]. Patient was given samples at his last dermatology office and said it did help when he would apply it.
Detail Level: Zone
Plan: F/u in 1 month for catarino. Will plan for taltz in the future.
Render In Strict Bullet Format?: No

## 2022-01-31 ENCOUNTER — RX ONLY (RX ONLY)
Age: 67
End: 2022-01-31

## 2022-01-31 RX ORDER — IXEKIZUMAB 80 MG/ML
INJECTION, SOLUTION SUBCUTANEOUS
Qty: 2 | Refills: 0 | Status: ERX | COMMUNITY
Start: 2022-01-31

## 2022-02-01 ENCOUNTER — APPOINTMENT (OUTPATIENT)
Age: 67
Setting detail: DERMATOLOGY
End: 2022-02-01

## 2022-02-01 DIAGNOSIS — L57.8 OTHER SKIN CHANGES DUE TO CHRONIC EXPOSURE TO NONIONIZING RADIATION: ICD-10-CM

## 2022-02-01 DIAGNOSIS — L40.0 PSORIASIS VULGARIS: ICD-10-CM

## 2022-02-01 DIAGNOSIS — L40.59 OTHER PSORIATIC ARTHROPATHY: ICD-10-CM

## 2022-02-01 DIAGNOSIS — L57.0 ACTINIC KERATOSIS: ICD-10-CM

## 2022-02-01 PROBLEM — D48.5 NEOPLASM OF UNCERTAIN BEHAVIOR OF SKIN: Status: ACTIVE | Noted: 2022-02-01

## 2022-02-01 PROBLEM — C44.519 BASAL CELL CARCINOMA OF SKIN OF OTHER PART OF TRUNK: Status: ACTIVE | Noted: 2022-02-01

## 2022-02-01 PROCEDURE — 11102 TANGNTL BX SKIN SINGLE LES: CPT

## 2022-02-01 PROCEDURE — 17003 DESTRUCT PREMALG LES 2-14: CPT

## 2022-02-01 PROCEDURE — OTHER PRESCRIPTION MEDICATION MANAGEMENT: OTHER

## 2022-02-01 PROCEDURE — OTHER DEFER: OTHER

## 2022-02-01 PROCEDURE — OTHER TALTZ INITIATION: OTHER

## 2022-02-01 PROCEDURE — OTHER BIOPSY BY SHAVE METHOD: OTHER

## 2022-02-01 PROCEDURE — 17000 DESTRUCT PREMALG LESION: CPT | Mod: 59

## 2022-02-01 PROCEDURE — OTHER LIQUID NITROGEN: OTHER

## 2022-02-01 PROCEDURE — OTHER MIPS QUALITY: OTHER

## 2022-02-01 PROCEDURE — 99214 OFFICE O/P EST MOD 30 MIN: CPT | Mod: 25

## 2022-02-01 PROCEDURE — 11103 TANGNTL BX SKIN EA SEP/ADDL: CPT

## 2022-02-01 PROCEDURE — OTHER COUNSELING: OTHER

## 2022-02-01 ASSESSMENT — LOCATION DETAILED DESCRIPTION DERM
LOCATION DETAILED: LEFT POPLITEAL SKIN
LOCATION DETAILED: RIGHT SECOND PROXIMAL INTERPHALANGEAL JOINT
LOCATION DETAILED: LEFT ELBOW
LOCATION DETAILED: RIGHT SUPERIOR PARIETAL SCALP
LOCATION DETAILED: RIGHT ELBOW
LOCATION DETAILED: LEFT KNEE
LOCATION DETAILED: RIGHT FOURTH PROXIMAL INTERPHALANGEAL JOINT
LOCATION DETAILED: PERIANAL SKIN
LOCATION DETAILED: RIGHT MEDIAL BUTTOCK
LOCATION DETAILED: LEFT SUPERIOR PARIETAL SCALP
LOCATION DETAILED: LEFT MEDIAL FRONTAL SCALP
LOCATION DETAILED: RIGHT DISTAL POSTERIOR THIGH
LOCATION DETAILED: SACRAL SPINE
LOCATION DETAILED: LEFT SECOND PROXIMAL INTERPHALANGEAL JOINT
LOCATION DETAILED: RIGHT LATERAL TRAPEZIAL NECK
LOCATION DETAILED: RIGHT KNEE

## 2022-02-01 ASSESSMENT — LOCATION ZONE DERM
LOCATION ZONE: NECK
LOCATION ZONE: TOE
LOCATION ZONE: TRUNK
LOCATION ZONE: LEG
LOCATION ZONE: SCALP
LOCATION ZONE: ANUS
LOCATION ZONE: ARM

## 2022-02-01 ASSESSMENT — LOCATION SIMPLE DESCRIPTION DERM
LOCATION SIMPLE: PERIANAL SKIN
LOCATION SIMPLE: RIGHT FOURTH PIPJ
LOCATION SIMPLE: LEFT ELBOW
LOCATION SIMPLE: RIGHT ELBOW
LOCATION SIMPLE: LOWER BACK
LOCATION SIMPLE: RIGHT SECOND PIPJ
LOCATION SIMPLE: RIGHT KNEE
LOCATION SIMPLE: POSTERIOR NECK
LOCATION SIMPLE: LEFT SECOND PIPJ
LOCATION SIMPLE: LEFT POPLITEAL SKIN
LOCATION SIMPLE: LEFT SCALP
LOCATION SIMPLE: RIGHT BUTTOCK
LOCATION SIMPLE: RIGHT POSTERIOR THIGH
LOCATION SIMPLE: SCALP
LOCATION SIMPLE: LEFT KNEE

## 2022-02-01 ASSESSMENT — BSA PSORIASIS: % BODY COVERED IN PSORIASIS: 19

## 2022-02-01 NOTE — PROCEDURE: PRESCRIPTION MEDICATION MANAGEMENT
Plan: Sent in taltz [place]
Continue Regimen: Enstilar or clobetasol until approved for taltz
Render In Strict Bullet Format?: No
Detail Level: Zone

## 2022-02-01 NOTE — PROCEDURE: DEFER
Introduction Text (Please End With A Colon): The following procedure was deferred: need pathology report
Detail Level: Detailed
Instructions (Optional): Need pathology report to figure out treatment plan

## 2022-02-01 NOTE — PROCEDURE: LIQUID NITROGEN
Render Note In Bullet Format When Appropriate: No
Show Aperture Variable?: Yes
Post-Care Instructions: I reviewed with the patient in detail post-care instructions. Patient is to wear sunprotection, and avoid picking at any of the treated lesions. Pt may apply Vaseline to crusted or scabbing areas.
Duration Of Freeze Thaw-Cycle (Seconds): 0
Detail Level: Detailed
Consent: The patient's consent was obtained including but not limited to risks of crusting, scabbing, blistering, scarring, darker or lighter pigmentary change, recurrence, incomplete removal and infection.

## 2022-02-01 NOTE — PROCEDURE: BIOPSY BY SHAVE METHOD
Bill For Surgical Tray: no
Post-Care Instructions: I reviewed with the patient in detail post-care instructions. Patient is to keep the biopsy site dry overnight, and then apply bacitracin twice daily until healed. Patient may apply hydrogen peroxide soaks to remove any crusting.
Hemostasis: Drysol
Anesthesia Type: 1% lidocaine with epinephrine
Depth Of Biopsy: dermis
Cryotherapy Text: The wound bed was treated with cryotherapy after the biopsy was performed.
Type Of Destruction Used: Curettage
X Size Of Lesion In Cm: 0
Curettage Text: The wound bed was treated with curettage after the biopsy was performed.
Was A Bandage Applied: Yes
Billing Type: Third-Party Bill
Dressing: bandage
Information: Selecting Yes will display possible errors in your note based on the variables you have selected. This validation is only offered as a suggestion for you. PLEASE NOTE THAT THE VALIDATION TEXT WILL BE REMOVED WHEN YOU FINALIZE YOUR NOTE. IF YOU WANT TO FAX A PRELIMINARY NOTE YOU WILL NEED TO TOGGLE THIS TO 'NO' IF YOU DO NOT WANT IT IN YOUR FAXED NOTE.
Electrodesiccation And Curettage Text: The wound bed was treated with electrodesiccation and curettage after the biopsy was performed.
Electrodesiccation Text: The wound bed was treated with electrodesiccation after the biopsy was performed.
Wound Care: Petrolatum
Silver Nitrate Text: The wound bed was treated with silver nitrate after the biopsy was performed.
Biopsy Type: H and E
Anesthesia Volume In Cc (Will Not Render If 0): 0.5
Biopsy Method: Dermablade
Detail Level: Detailed
Notification Instructions: Patient will be notified of biopsy results. However, patient instructed to call the office if not contacted within 2 weeks.
Consent: Written consent was obtained and risks were reviewed including but not limited to scarring, infection, bleeding, scabbing, incomplete removal, nerve damage and allergy to anesthesia.

## 2022-02-01 NOTE — PROCEDURE: MIPS QUALITY
Quality 111:Pneumonia Vaccination Status For Older Adults: Pneumococcal Vaccination Administered
Quality 226: Preventive Care And Screening: Tobacco Use: Screening And Cessation Intervention: Patient screened for tobacco use and is an ex/non-smoker
Quality 110: Preventive Care And Screening: Influenza Immunization: Influenza Immunization previously received during influenza season
Detail Level: Detailed

## 2022-03-08 ENCOUNTER — APPOINTMENT (OUTPATIENT)
Age: 67
Setting detail: DERMATOLOGY
End: 2022-03-08

## 2022-03-08 DIAGNOSIS — D485 NEOPLASM OF UNCERTAIN BEHAVIOR OF SKIN: ICD-10-CM

## 2022-03-08 PROBLEM — D04.5 CARCINOMA IN SITU OF SKIN OF TRUNK: Status: ACTIVE | Noted: 2022-03-08

## 2022-03-08 PROBLEM — D48.5 NEOPLASM OF UNCERTAIN BEHAVIOR OF SKIN: Status: ACTIVE | Noted: 2022-03-08

## 2022-03-08 PROBLEM — D04.71 CARCINOMA IN SITU OF SKIN OF RIGHT LOWER LIMB, INCLUDING HIP: Status: ACTIVE | Noted: 2022-03-08

## 2022-03-08 PROCEDURE — 17260 DSTRJ MAL LES T/A/L 0.5 CM/<: CPT | Mod: 59,76

## 2022-03-08 PROCEDURE — OTHER CURETTAGE AND DESTRUCTION: OTHER

## 2022-03-08 PROCEDURE — OTHER BIOPSY BY SHAVE METHOD: OTHER

## 2022-03-08 PROCEDURE — 17260 DSTRJ MAL LES T/A/L 0.5 CM/<: CPT | Mod: 59

## 2022-03-08 PROCEDURE — 11102 TANGNTL BX SKIN SINGLE LES: CPT

## 2022-03-08 PROCEDURE — OTHER COUNSELING: OTHER

## 2022-03-08 ASSESSMENT — LOCATION DETAILED DESCRIPTION DERM: LOCATION DETAILED: RIGHT LATERAL SUPERIOR CHEST

## 2022-03-08 ASSESSMENT — LOCATION ZONE DERM: LOCATION ZONE: TRUNK

## 2022-03-08 ASSESSMENT — LOCATION SIMPLE DESCRIPTION DERM: LOCATION SIMPLE: CHEST

## 2022-03-08 NOTE — PROCEDURE: CURETTAGE AND DESTRUCTION
Number Of Curettages: 3
Total Volume (Ccs): 1
Bill As A Line Item Or As Units: Line Item
Consent was obtained from the patient. The risks, benefits and alternatives to therapy were discussed in detail. Specifically, the risks of infection, scarring, bleeding, prolonged wound healing, nerve injury, incomplete removal, allergy to anesthesia and recurrence were addressed. Alternatives to ED&C, such as: surgical removal and XRT were also discussed.  Prior to the procedure, the treatment site was clearly identified and confirmed by the patient. All components of Universal Protocol/PAUSE Rule completed.
Anesthesia Type: 1% lidocaine with epinephrine
Post-Care Instructions: I reviewed with the patient in detail post-care instructions. Patient is to keep the area dry for 48 hours, and not to engage in any swimming until the area is healed. Should the patient develop any fevers, chills, bleeding, severe pain patient will contact the office immediately.
Additional Information: (Optional): The wound was cleaned, and a pressure dressing was applied.  The patient received detailed post-op instructions.
Cautery Type: electrodesiccation
Add Ability To Document Additional Intralesional Injection: No
Detail Level: Detailed
What Was Performed First?: Curettage
Size Of Lesion After Curettage: 0
Biopsy Photograph Reviewed: Yes
Final Size Statement: The size of the lesion after curettage was
Size Of Lesion In Cm: 1.1

## 2022-03-08 NOTE — PROCEDURE: BIOPSY BY SHAVE METHOD
Validate That Anesthesia Is Not 0: No
Additional Anesthesia Volume In Cc (Will Not Render If 0): 0
Electrodesiccation And Curettage Text: The wound bed was treated with electrodesiccation and curettage after the biopsy was performed.
Notification Instructions: Patient will be notified of biopsy results. However, patient instructed to call the office if not contacted within 2 weeks.
Biopsy Type: H and E
Type Of Destruction Used: Curettage
Wound Care: Petrolatum
Was A Bandage Applied: Yes
Electrodesiccation Text: The wound bed was treated with electrodesiccation after the biopsy was performed.
Consent: Written consent was obtained and risks were reviewed including but not limited to scarring, infection, bleeding, scabbing, incomplete removal, nerve damage and allergy to anesthesia.
Cryotherapy Text: The wound bed was treated with cryotherapy after the biopsy was performed.
Post-Care Instructions: I reviewed with the patient in detail post-care instructions. Patient is to keep the biopsy site dry overnight, and then apply bacitracin twice daily until healed. Patient may apply hydrogen peroxide soaks to remove any crusting.
Curettage Text: The wound bed was treated with curettage after the biopsy was performed.
Dressing: bandage
Detail Level: Detailed
Anesthesia Type: 1% lidocaine with epinephrine
Biopsy Method: Dermablade
Information: Selecting Yes will display possible errors in your note based on the variables you have selected. This validation is only offered as a suggestion for you. PLEASE NOTE THAT THE VALIDATION TEXT WILL BE REMOVED WHEN YOU FINALIZE YOUR NOTE. IF YOU WANT TO FAX A PRELIMINARY NOTE YOU WILL NEED TO TOGGLE THIS TO 'NO' IF YOU DO NOT WANT IT IN YOUR FAXED NOTE.
Hemostasis: Drysol
Depth Of Biopsy: dermis
Anesthesia Volume In Cc (Will Not Render If 0): 0.5
Billing Type: Third-Party Bill
Silver Nitrate Text: The wound bed was treated with silver nitrate after the biopsy was performed.

## 2022-03-28 ENCOUNTER — RX ONLY (RX ONLY)
Age: 67
End: 2022-03-28

## 2022-03-28 RX ORDER — IXEKIZUMAB 80 MG/ML
INJECTION, SOLUTION SUBCUTANEOUS
Qty: 2 | Refills: 4 | Status: ERX | COMMUNITY
Start: 2022-03-28

## 2022-05-24 ENCOUNTER — APPOINTMENT (OUTPATIENT)
Age: 67
Setting detail: DERMATOLOGY
End: 2022-05-24

## 2022-05-24 DIAGNOSIS — D485 NEOPLASM OF UNCERTAIN BEHAVIOR OF SKIN: ICD-10-CM

## 2022-05-24 DIAGNOSIS — L40.0 PSORIASIS VULGARIS: ICD-10-CM

## 2022-05-24 PROBLEM — D48.5 NEOPLASM OF UNCERTAIN BEHAVIOR OF SKIN: Status: ACTIVE | Noted: 2022-05-24

## 2022-05-24 PROCEDURE — OTHER COUNSELING: OTHER

## 2022-05-24 PROCEDURE — OTHER TREATMENT REGIMEN: OTHER

## 2022-05-24 PROCEDURE — OTHER BIOPSY BY SHAVE METHOD: OTHER

## 2022-05-24 PROCEDURE — OTHER TALTZ MONITORING: OTHER

## 2022-05-24 PROCEDURE — 11102 TANGNTL BX SKIN SINGLE LES: CPT

## 2022-05-24 PROCEDURE — 99213 OFFICE O/P EST LOW 20 MIN: CPT | Mod: 25

## 2022-05-24 ASSESSMENT — LOCATION SIMPLE DESCRIPTION DERM
LOCATION SIMPLE: RIGHT BUTTOCK
LOCATION SIMPLE: LEFT BUTTOCK
LOCATION SIMPLE: RIGHT UPPER BACK
LOCATION SIMPLE: RIGHT UPPER ARM
LOCATION SIMPLE: LEFT UPPER ARM
LOCATION SIMPLE: LEFT PRETIBIAL REGION
LOCATION SIMPLE: ABDOMEN
LOCATION SIMPLE: RIGHT PRETIBIAL REGION

## 2022-05-24 ASSESSMENT — LOCATION DETAILED DESCRIPTION DERM
LOCATION DETAILED: LEFT DISTAL PRETIBIAL REGION
LOCATION DETAILED: LEFT PROXIMAL PRETIBIAL REGION
LOCATION DETAILED: LEFT BUTTOCK
LOCATION DETAILED: LEFT PROXIMAL POSTERIOR UPPER ARM
LOCATION DETAILED: RIGHT DISTAL LATERAL POSTERIOR UPPER ARM
LOCATION DETAILED: RIGHT BUTTOCK
LOCATION DETAILED: RIGHT INFERIOR MEDIAL UPPER BACK
LOCATION DETAILED: PERIUMBILICAL SKIN
LOCATION DETAILED: RIGHT DISTAL PRETIBIAL REGION

## 2022-05-24 ASSESSMENT — LOCATION ZONE DERM
LOCATION ZONE: TRUNK
LOCATION ZONE: LEG
LOCATION ZONE: ARM

## 2022-05-24 NOTE — HPI: MEDICATION (TALTZ)
How Severe Is It?: moderate
Is This A New Presentation, Or A Follow-Up?: Follow Up Psoriasis
How Many Months Of Taltz Have You Completed?: 3

## 2022-05-24 NOTE — PROCEDURE: BIOPSY BY SHAVE METHOD
Additional Anesthesia Volume In Cc (Will Not Render If 0): 0
Information: Selecting Yes will display possible errors in your note based on the variables you have selected. This validation is only offered as a suggestion for you. PLEASE NOTE THAT THE VALIDATION TEXT WILL BE REMOVED WHEN YOU FINALIZE YOUR NOTE. IF YOU WANT TO FAX A PRELIMINARY NOTE YOU WILL NEED TO TOGGLE THIS TO 'NO' IF YOU DO NOT WANT IT IN YOUR FAXED NOTE.
Bill 37915 For Specimen Handling/Conveyance To Laboratory?: no
Depth Of Biopsy: dermis
Silver Nitrate Text: The wound bed was treated with silver nitrate after the biopsy was performed.
Hemostasis: Drysol
Billing Type: Third-Party Bill
Electrodesiccation And Curettage Text: The wound bed was treated with electrodesiccation and curettage after the biopsy was performed.
Type Of Destruction Used: Curettage
Biopsy Type: H and E
Wound Care: Petrolatum
Electrodesiccation Text: The wound bed was treated with electrodesiccation after the biopsy was performed.
Was A Bandage Applied: Yes
Consent: Written consent was obtained and risks were reviewed including but not limited to scarring, infection, bleeding, scabbing, incomplete removal, nerve damage and allergy to anesthesia.
Notification Instructions: Patient will be notified of biopsy results. However, patient instructed to call the office if not contacted within 2 weeks.
Post-Care Instructions: I reviewed with the patient in detail post-care instructions. Patient is to keep the biopsy site dry overnight, and then apply bacitracin twice daily until healed. Patient may apply hydrogen peroxide soaks to remove any crusting.
Detail Level: Detailed
Cryotherapy Text: The wound bed was treated with cryotherapy after the biopsy was performed.
Anesthesia Volume In Cc (Will Not Render If 0): 0.5
Anesthesia Type: 1% lidocaine with epinephrine
Curettage Text: The wound bed was treated with curettage after the biopsy was performed.
Dressing: bandage
Biopsy Method: Dermablade

## 2022-05-24 NOTE — PROCEDURE: TREATMENT REGIMEN
Detail Level: Zone
Plan: Patient is moving to [place]. Will fax all medical records to his new dermatologist
Continue Regimen: Clobetasol cream bid to flared areas

## 2025-02-17 NOTE — TELEPHONE ENCOUNTER
----- Message from Rene Booker sent at 5/12/2020  3:05 PM EDT -----  Regarding: Dr. Breanne Rodrigues  General Message/Vendor Calls    Caller's first and last name:  [address]     Reason for call:  Requesting to speak with the nurse    Callback required yes/no and why:  yes    Best contact number(s):  (S)[phone]    Details to clarify the request:  For the next refill of \"Glatopa\" prior authorization is required. Forms have been faxed to [phone] today.   Rene Booker 17-Feb-2025 06:45 solids